# Patient Record
Sex: FEMALE | Race: WHITE | URBAN - METROPOLITAN AREA
[De-identification: names, ages, dates, MRNs, and addresses within clinical notes are randomized per-mention and may not be internally consistent; named-entity substitution may affect disease eponyms.]

---

## 2017-03-14 ENCOUNTER — TRANSFERRED RECORDS (OUTPATIENT)
Dept: HEALTH INFORMATION MANAGEMENT | Facility: CLINIC | Age: 37
End: 2017-03-14

## 2017-03-14 LAB
HPV ABSTRACT: NORMAL
PAP-ABSTRACT: NORMAL

## 2017-06-03 ENCOUNTER — HEALTH MAINTENANCE LETTER (OUTPATIENT)
Age: 37
End: 2017-06-03

## 2017-07-09 ENCOUNTER — OFFICE VISIT (OUTPATIENT)
Dept: URGENT CARE | Facility: URGENT CARE | Age: 37
End: 2017-07-09
Payer: COMMERCIAL

## 2017-07-09 VITALS
WEIGHT: 136 LBS | OXYGEN SATURATION: 98 % | DIASTOLIC BLOOD PRESSURE: 80 MMHG | RESPIRATION RATE: 14 BRPM | BODY MASS INDEX: 27.47 KG/M2 | TEMPERATURE: 98.2 F | SYSTOLIC BLOOD PRESSURE: 120 MMHG | HEART RATE: 86 BPM

## 2017-07-09 DIAGNOSIS — R30.0 DYSURIA: ICD-10-CM

## 2017-07-09 DIAGNOSIS — N30.00 ACUTE CYSTITIS WITHOUT HEMATURIA: Primary | ICD-10-CM

## 2017-07-09 DIAGNOSIS — B37.31 YEAST INFECTION OF THE VAGINA: ICD-10-CM

## 2017-07-09 LAB
ALBUMIN UR-MCNC: 30 MG/DL
APPEARANCE UR: CLEAR
BACTERIA #/AREA URNS HPF: ABNORMAL /HPF
BILIRUB UR QL STRIP: ABNORMAL
COLOR UR AUTO: YELLOW
GLUCOSE UR STRIP-MCNC: NEGATIVE MG/DL
HGB UR QL STRIP: ABNORMAL
KETONES UR STRIP-MCNC: NEGATIVE MG/DL
LEUKOCYTE ESTERASE UR QL STRIP: ABNORMAL
NITRATE UR QL: NEGATIVE
NON-SQ EPI CELLS #/AREA URNS LPF: ABNORMAL /LPF
PH UR STRIP: 5.5 PH (ref 5–7)
RBC #/AREA URNS AUTO: ABNORMAL /HPF (ref 0–2)
SP GR UR STRIP: 1.02 (ref 1–1.03)
URN SPEC COLLECT METH UR: ABNORMAL
UROBILINOGEN UR STRIP-ACNC: 0.2 EU/DL (ref 0.2–1)
WBC #/AREA URNS AUTO: >100 /HPF (ref 0–2)

## 2017-07-09 PROCEDURE — 87186 SC STD MICRODIL/AGAR DIL: CPT | Performed by: PHYSICIAN ASSISTANT

## 2017-07-09 PROCEDURE — 81001 URINALYSIS AUTO W/SCOPE: CPT | Performed by: FAMILY MEDICINE

## 2017-07-09 PROCEDURE — 99213 OFFICE O/P EST LOW 20 MIN: CPT | Performed by: PHYSICIAN ASSISTANT

## 2017-07-09 PROCEDURE — 87086 URINE CULTURE/COLONY COUNT: CPT | Performed by: PHYSICIAN ASSISTANT

## 2017-07-09 RX ORDER — NITROFURANTOIN 25; 75 MG/1; MG/1
100 CAPSULE ORAL 2 TIMES DAILY
Qty: 14 CAPSULE | Refills: 0 | Status: SHIPPED | OUTPATIENT
Start: 2017-07-09 | End: 2017-09-18

## 2017-07-09 RX ORDER — FLUCONAZOLE 150 MG/1
150 TABLET ORAL ONCE
Qty: 1 TABLET | Refills: 0 | Status: SHIPPED | OUTPATIENT
Start: 2017-07-09 | End: 2017-07-09

## 2017-07-09 NOTE — PATIENT INSTRUCTIONS
"   * BLADDER INFECTION,Female (Adult)    A bladder infection (\"cystitis\" or \"UTI\") usually causes a constant urge to urinate and a burning when passing urine. Urine may be cloudy, smelly or dark. There may be pain in the lower abdomen. A bladder infection occurs when bacteria from the vaginal area enter the bladder opening (urethra). This can occur from sexual intercourse, wearing tight clothing, dehydration and other factors.  HOME CARE:  1. Drink lots of fluids (at least 6-8 glasses a day, unless you must restrict fluids for other medical reasons). This will force the medicine into your urinary system and flush the bacteria out of your body. Cranberry juice has been shown to help clear out the bacteria.  2. Avoid sexual intercourse until your symptoms are gone.  3. A bladder infection is treated with antibiotics. You may also be given Pyridium (generic = phenazopyridine) to reduce the burning sensation. This medicine will cause your urine to become a bright orange color. The orange urine may stain clothing. You may wear a pad or panty-liner to protect clothing.  PREVENTING FUTURE INFECTIONS:  1. Always wipe from front to back after a bowel movement.  2. Keep the genital area clean and dry.  3. Drink plenty of fluids each day to avoid dehydration.  4. Urinate right after intercourse to flush out the bladder.  5. Wear cotton underwear and cotton-lined panty hose; avoid tight-fitting pants.  6. If you are on birth control pills and are having frequent bladder infections, discuss with your doctor.  FOLLOW UP: Return to this facility or see your doctor if ALL symptoms are not gone after three days of treatment.  GET PROMPT MEDICAL ATTENTION if any of the following occur:    Fever over 101 F (38.3 C)    No improvement by the third day of treatment    Increasing back or abdominal pain    Repeated vomiting; unable to keep medicine down    Weakness, dizziness or fainting    Vaginal discharge    Pain, redness or swelling in " the labia (outer vaginal area)    7784-6898 [employer], [address] [place] [address], [place] [address]. All rights reserved. This information is not intended as a substitute for professional medical care. Always follow your healthcare professional's instructions.

## 2017-07-09 NOTE — PROGRESS NOTES
Mayte DUENAS Wendipresents to clinic today c/o 1 day hx of dysuria, urinary urgency/frequency and suprapubic area pressure.    No hx of resistant infection, kidney stones, or kidney infections.      Red Flag Review: Denies any abdominal pain or flank pain. Denies any F/C/N/V/D or other acute sxs.    General Disposition. Despite above acute illness sxs, patient still alert, active and taking in PO solids and fluids.       ROS:     GI: Denies any abdominal pain. Denies any F/C/N/V/D.     GYN: Denies any pelvic pain. No abnormal vaginal discharge.No concerns about pregnancy or STD. Declined HCG screening and STD screening today.   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    No Known Allergies      OBJECTIVE:  /80 (BP Location: Right arm, Cuff Size: Adult Regular)  Pulse 86  Temp 98.2  F (36.8  C) (Oral)  Resp 14  Wt 136 lb (61.7 kg)  LMP 06/25/2017 (Approximate)  SpO2 98%  BMI 27.47 kg/m2        GENERAL:  Very pleasant, comfortable and generally well appearing.  SKIN: No rashes.  Normal color.  Sclera clear.  CARDIAC:NORMAL - regular rate and rhythm without murmur., normal s1/s2 and without extra heart sounds  RESP: Normal - CTA without rales, rhonchi, or wheezing.  ABDOMEN:  Soft, non-tender, non-distended.  Positive normal bowel sounds.  No HSM or masses.  Positive, mild, suprapubic tenderness.  No CVA tenderness.  NEURO: Alert and oriented.  Normal speech and mentation.  CN II/XII grossly intact.  Gait within normal limits.      Component      Latest Ref Rng & Units 7/9/2017   Color Urine       Yellow   Appearance Urine       Clear   Glucose Urine       "NEG mg/dL Negative   Bilirubin Urine      NEG Small (A) . . .   Ketones Urine      NEG mg/dL Negative   Specific Gravity Urine      1.003 - 1.035 1.025   Blood Urine      NEG Small (A)   pH Urine      5.0 - 7.0 pH 5.5   Protein Albumin Urine      NEG mg/dL 30 (A)   Urobilinogen Urine      0.2 - 1.0 EU/dL 0.2   Nitrite Urine      NEG Negative   Leukocyte Esterase Urine      NEG Small (A)   Source       Midstream Urine   WBC Urine      0 - 2 /HPF >100 (A)   RBC Urine      0 - 2 /HPF 2-5 (A)   Squamous Epithelial /LPF Urine      FEW /LPF Few   Bacteria Urine      NEG /HPF Many (A)     ASSESSMENT/PLAN:    (N30.00) Acute cystitis without hematuria  (primary encounter diagnosis)  Plan: nitroFURantoin, macrocrystal-monohydrate,         (MACROBID) 100 MG capsule        1. Abx as per above.     2. Push plain, non-carbonated water.  Avoid acidic fluids and bladder irritants (reviewed with patient). Take full course of antibiotic as prescribed.     3. Follow-up immediately if any fever, chills, nausea, vomiting, back or flank pain.  Follow-up with PCP if there are any residual urinary symptoms after course of prescribed antibiotics.        4. Follow-up with PCP if sxs change, worsen or fail to fully resolve with above tx.       5. In addition to the above, pyelonephritis and UTI \"red flag\" signs and sxs are reviewed with pt both verbally and by way of printed educational material for home review.  Pt verbalizes understanding of and agrees to the above plan.         (R30.0) Dysuria  Plan: UA reflex to Microscopic and Culture, Urine         Microscopic, Urine Culture Aerobic Bacterial,         CANCELED: *UA reflex to Microscopic and Culture        ([facility] (except [place]        and [place])      (B37.3) Yeast infection of the vagina  Comment: Patient requests \"just in case\" rx for Diflucan. States she always gets yeast vaginitis with use of abx   Plan: fluconazole (DIFLUCAN) 150 MG tablet        "

## 2017-07-09 NOTE — MR AVS SNAPSHOT
"              After Visit Summary   7/9/2017    Mayte Adame    MRN: [id number]           Patient Information     Date Of Birth          1980        Visit Information        Provider Department      7/9/2017 11:30 AM Eliseo Camacho PA-C [facility]        Today's Diagnoses     Acute cystitis without hematuria    -  1    Dysuria        Yeast infection of the vagina          Care Instructions       * BLADDER INFECTION,Female (Adult)    A bladder infection (\"cystitis\" or \"UTI\") usually causes a constant urge to urinate and a burning when passing urine. Urine may be cloudy, smelly or dark. There may be pain in the lower abdomen. A bladder infection occurs when bacteria from the vaginal area enter the bladder opening (urethra). This can occur from sexual intercourse, wearing tight clothing, dehydration and other factors.  HOME CARE:  1. Drink lots of fluids (at least 6-8 glasses a day, unless you must restrict fluids for other medical reasons). This will force the medicine into your urinary system and flush the bacteria out of your body. Cranberry juice has been shown to help clear out the bacteria.  2. Avoid sexual intercourse until your symptoms are gone.  3. A bladder infection is treated with antibiotics. You may also be given Pyridium (generic = phenazopyridine) to reduce the burning sensation. This medicine will cause your urine to become a bright orange color. The orange urine may stain clothing. You may wear a pad or panty-liner to protect clothing.  PREVENTING FUTURE INFECTIONS:  1. Always wipe from front to back after a bowel movement.  2. Keep the genital area clean and dry.  3. Drink plenty of fluids each day to avoid dehydration.  4. Urinate right after intercourse to flush out the bladder.  5. Wear cotton underwear and cotton-lined panty hose; avoid tight-fitting pants.  6. If you are on birth control pills and are having frequent bladder infections, discuss with " your doctor.  FOLLOW UP: Return to this facility or see your doctor if ALL symptoms are not gone after three days of treatment.  GET PROMPT MEDICAL ATTENTION if any of the following occur:    Fever over 101 F (38.3 C)    No improvement by the third day of treatment    Increasing back or abdominal pain    Repeated vomiting; unable to keep medicine down    Weakness, dizziness or fainting    Vaginal discharge    Pain, redness or swelling in the labia (outer vaginal area)    [id number] [employer], [address] [place] [address], [place] [address]. All rights reserved. This information is not intended as a substitute for professional medical care. Always follow your healthcare professional's instructions.            Follow-ups after your visit        Who to contact     If you have questions or need follow up information about today's clinic visit or your schedule please contact [facility] directly at [phone].  Normal or non-critical lab and imaging results will be communicated to you by [employer], letter or phone within 4 business days after the clinic has received the results. If you do not hear from us within 7 days, please contact the clinic through [employer] or phone. If you have a critical or abnormal lab result, we will notify you by phone as soon as possible.  Submit refill requests through [employer] or call your pharmacy and they will forward the refill request to us. Please allow 3 business days for your refill to be completed.          Additional Information About Your Visit        [employer] Information     [employer] gives you secure access to your electronic health record. If you see a primary care provider, you can also send messages to your care team and make appointments. If you have questions, please call your primary care clinic.  If you do not have a primary care provider, please call [phone] and they will assist you.        Care EveryWhere ID     This is your Care EveryWhere ID. This  could be used by other organizations to access your [facility] medical records  [id number]        Your Vitals Were     Pulse Temperature Respirations Last Period Pulse Oximetry BMI (Body Mass Index)    86 98.2  F (36.8  C) (Oral) 14 06/25/2017 (Approximate) 98% 27.47 kg/m2       Blood Pressure from Last 3 Encounters:   07/09/17 120/80   01/28/16 126/89   01/14/16 121/82    Weight from Last 3 Encounters:   07/09/17 136 lb (61.7 kg)   01/28/16 181 lb 8 oz (82.3 kg)   01/14/16 188 lb 14.4 oz (85.7 kg)              We Performed the Following     UA reflex to Microscopic and Culture     Urine Culture Aerobic Bacterial     Urine Microscopic          Today's Medication Changes          These changes are accurate as of: 7/9/17 12:24 PM.  If you have any questions, ask your nurse or doctor.               Start taking these medicines.        Dose/Directions    fluconazole 150 MG tablet   Commonly known as:  DIFLUCAN   Used for:  Yeast infection of the vagina        Dose:  150 mg   Take 1 tablet (150 mg) by mouth once for 1 dose   Quantity:  1 tablet   Refills:  0       nitroFURantoin (macrocrystal-monohydrate) 100 MG capsule   Commonly known as:  MACROBID   Used for:  Acute cystitis without hematuria        Dose:  100 mg   Take 1 capsule (100 mg) by mouth 2 times daily   Quantity:  14 capsule   Refills:  0            Where to get your medicines      These medications were sent to [id number]  [place], [place] - [address] [facility]  AT [facility] & [address] [place] [address]     Phone:  [phone]     fluconazole 150 MG tablet    nitroFURantoin (macrocrystal-monohydrate) 100 MG capsule                Primary Care Provider    [facility]                Equal Access to Services     [facility] [place] AH: Ky Hernandez, sabrina larson, ruddy pollock. So [facility] [phone].    ATENCIÓN: Si alf vela, alejo manzo hernandez  disposición servicios gratuitos de asistencia lingüística. Trixie davison [phone].    We comply with applicable federal civil rights laws and [place] laws. We do not discriminate on the basis of race, color, national origin, age, disability sex, sexual orientation or gender identity.            Thank you!     Thank you for choosing [facility]  for your care. Our goal is always to provide you with excellent care. Hearing back from our patients is one way we can continue to improve our services. Please take a few minutes to complete the written survey that you may receive in the mail after your visit with us. Thank you!             Your Updated Medication List - Protect others around you: Learn how to safely use, store and throw away your medicines at www.disposemymeds.org.          This list is accurate as of: 7/9/17 12:24 PM.  Always use your most recent med list.                   Brand Name Dispense Instructions for use Diagnosis    fluconazole 150 MG tablet    DIFLUCAN    1 tablet    Take 1 tablet (150 mg) by mouth once for 1 dose    Yeast infection of the vagina       nitroFURantoin (macrocrystal-monohydrate) 100 MG capsule    MACROBID    14 capsule    Take 1 capsule (100 mg) by mouth 2 times daily    Acute cystitis without hematuria

## 2017-07-11 LAB
BACTERIA SPEC CULT: ABNORMAL
MICRO REPORT STATUS: ABNORMAL
MICROORGANISM SPEC CULT: ABNORMAL
SPECIMEN SOURCE: ABNORMAL

## 2017-08-23 ENCOUNTER — HOSPITAL ENCOUNTER (OUTPATIENT)
Dept: BEHAVIORAL HEALTH | Facility: CLINIC | Age: 37
Discharge: HOME | End: 2017-08-23
Attending: SOCIAL WORKER | Admitting: SOCIAL WORKER
Payer: COMMERCIAL

## 2017-08-23 PROCEDURE — H0001 ALCOHOL AND/OR DRUG ASSESS: HCPCS

## 2017-08-23 NOTE — PROGRESS NOTES
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 what was helpful to abstain was being connected to recovery community stuff. Clt reported what lead back to use was weight gain. Clt reported cravings to use and triggers of overeating, and inability to focus and work. Clt reported 9 treatments in her lifetime in which she completed 3 of them. Clt reported past and current meeting attendance.          DIMENSION VI - Recovery Environment   1. Are you employed/attending school? Tell me about that.     Per clt: [employer], part time, supportive in taking a leave of absence    2A. Describe a typical day; evening for you. Work, school, social, leisure, volunteer, spiritual practices. Include time spent obtaining, using, recovering from drugs or alcohol. (DSM)     Per clt: get up and work from 10-4 M-Thursday, get home, make dinner, then do meeting, she stays home, otherwise spend time with her, weekends she goes with dad, and that is when I typically end of using.     2B. How often do you spend more time than you planned using or use more than you planned? (DSM)     Per clt: once per week. When doing meth.     3. How important is using to your  "social connections? Do many of your family or friends use?     Per clt: it is not important, my good friends do not use. Isolated thing    4A. Are you currently in a significant relationship?     No    4C. Sexual Orientation:     Heterosexual    5A. Who do you live with?      Per clt: daughter    5B. Tell me about their alcohol/drug use and mental health issues.     NA    5C. Are you concerned for your safety there? No    5D. Are you concerned about the safety of anyone else who lives with you? No    6A. Do you have children who live with you?     Yes.  (Ask follow-up questions to determine the person s relationship and responsibility, both legal and care giving; and what arrangements are made for supervision for the children when the person is not available.) per clt: Ganga, 11, goes with dad on weekends, he is fighting for full custody, right now I have full custody, and allow him to see her whenever he wants to, pretty open.    6B. Do you have children who do not live with you?     Yes.  (Ask follow up questions to learn where the children are, who has custody and what the person s relationship and responsibility is with these children and what hopes the person has for his or her future with these children.)Per clt:  Mikey 18, in college. Do not see him very often, goes to [employer]. Living down there.     7A. Who supports you in making changes in your alcohol or drug use? What are they willing to do to support you? Who is upset or angry about you making changes in your alcohol or drug use? How big a problem is this for you?      Per clt: \"family, friends, employer\".    7B. This table is provided to record information about the person s relationships and available support It is not necessary to ask each item; only to get a comprehensive picture of their support system.  How often can you count on the following people when you need someone?   Partner / Spouse N/A   Parent(s)/Aunt(s)/Uncle(s)/Grandparents " Always supportive   Sibling(s)/Cousin(s) Always supportive   Child(nav) Usually supportive   Other relative(s) Always supportive   Friend(s)/neighbor(s) Usually supportive   Child(nav) s father(s)/mother(s) Usually supportive   Support group member(s) Usually supportive   Community of samia members Always supportive   /counselor/therapist/healer Always supportive   Other (specify) N/A     8A. What is your current living situation?     Per clt: Live with daughter in apartment.     8B. What is your long term plan for where you will be living?     Per clt: year lease there.    8C. Tell me about your living environment/neighborhood? Ask enough follow up questions to determine safety, criminal activity, availability of alcohol and drugs, supportive or antagonistic to the person making changes.      Per clt: [place], during the day feel safe, little different at night, little rougher then I realized but not like [place] so it is fine.     9. Criminal justice history: Gather current/recent history and any significant history related to substance use--Arrests? Convictions? Circumstances? Alcohol or drug involvement? Sentences? Still on probation or parole? Expectations of the court? Current court order? Any sex offenses - lifetime? What level? (DSM)    Per clt: None, denied past or current legals. Reported Only Family Court case and CPS case.  Legal-no criminal record, just Family Services, through [facility], CPS case started I believe two months ago, , Rosey, do not know her last name. She is not my worker she is going to report back to court if I am addressing chemical health needs.     10. What obstacles exist to participating in treatment? (Time off work, childcare, funding, transportation, pending MCC time, living situation)     None    Dimension VI Ratings   Recovery environment - The placing authority must use the criteria in Dimension VI to determine a client s  recovery environment.   RISK DESCRIPTIONS - Severity ratin Client has (A) Chronically antagonistic significant other, living environment, family, peer group or long-term criminal justice involvement that is harmful to recovery or treatment progress, or (B) Client has an actively antagonistic significant other, family, work, or living environment with immediate threat to the client s safety and well-being.    REASONS SEVERITY WAS ASSIGNED - (What support does the person have for making changes? What structure/stability does the person have in his or her daily life that will increase the likelihood that changes can be sustained? What problems exist in the person s environment that will jeopardize getting/staying clean and sober?)     (B) Marcelo reported she works part time but reported her employer is supportive of her taking a [employer]. Marcelo reported using is not important to her good friends. Marcelo reported she lives with her daughter. Marcelo denied being in a romantic relationship at this time. Marcelo reported she has full custody of her daughter at this time but there is a case through Family Court Services as daughter's father is fighting for full custody. Joelt reported daughter goes with father on weekends but he is able to see her anytime. Joelt reported she has one adult son who lives independently. Marcelo reported family, friends, and employer are supportive of her. Marcelo denied past or current legal issues. Marcelo reported she has a case with CPS and also another case with Family Court Services.         Client Choice/Exceptions   Would you like services specific to language, age, gender, culture, Religion preference, race, ethnicity, sexual orientation or disability?  No    What particular treatment choices and options would you like to have? IP    Do you have a preference for a particular treatment program? [facility]    Criteria for Diagnosis     Criteria for Diagnosis  DSM-5 Criteria for Substance  Use Disorder  Instructions: Determine whether the client currently meets the criteria for Substance Use Disorder using the diagnostic criteria in the DSM-V [id number].[phone]. Current means during the most recent 12 months outside a facility that controls access to substances    Category of Substance Severity (ICD-10 Code / DSM 5 Code)     Alcohol Use Disorder Moderate  (F10.20) (303.90)   Cannabis Use Disorder Severe   (F12.20) (304.30)   Hallucinogen Use Disorder NA   Inhalant Use Disorder NA   Opioid Use Disorder NA   Sedative, Hypnotic, or Anxiolytic Use Disorder NA   Stimulant Related Disorder Severe   (F15.20) (304.40) Amphetamine type substance   Tobacco Use Disorder NA   Other (or unknown) Substance Use Disorder NA       Collateral Contact Summary   Number of contacts made: 2, 3 received information on 8/24/17 for 3rd collateral contact.    Contact with referring person:  Yes, [facility].    If court related records were reviewed, summarize here: NA    Information from collateral contacts supported/largely agreed with information from the client and associated risk ratings.      Rule 25 Assessment Summary and Plan   's Recommendation    1. Abstain from using all non-prescribed mood altering chemicals and substances, including alcohol.   2. Attend and complete an inpatient treatment program such as [facility] program. Follow all recommendations and referrals made by counselor(s) during admission and at discharge of program.  3. Continue to attend sober support group meetings weekly until admitted to inpatient treatment program.   4. Comply with all requirements/referrals made by CPS, and Family Court Services.   5. Continue care with all care providers such as therapist and follow recommendations and referrals made by therapist.    Rationale: Clt reported her use has negatively impacted multiple areas of her life. Clt reported she has continued to use even knowing the negative  consequences of her use. Clt appears to be at a high risk for continued use and reported she is unable to abstain from use in her current environment. These are the reasons for the above recommendations.      Collateral Contacts     Name:    [facility] ([facility])   Relationship:    Medical Records   Phone Number:    NA Releases:    Yes     See throughout above cd assessment collateral obtained from EMR.    Collateral Contacts     Name:    Fallon Jerson/ Lyle Conn   Relationship:    Therapist   Phone Number:    [phone] Releases:    Yes     Left  for collateral purposes on 8/23/17. Unable to obtain collateral information by the signing of this assessment. Received a call from collateral contact on 8/24/17 at 10:06AM providing collateral information. Collateral reported he had been meeting with her 5x. Came earlier in the year 2x and then discontinued coming here as she was actively using at the time. Four times since 7/20/17 she is really struggling, she really could use some help probably inpatient in my opinion, most appropriate for her right now. We did come up with a robust plan last week while at home, and to attend meetings and she was not able to make it,did continue to use at home, I believe only 48 hours. Motivated to stop use and has been to treatment multiple times. If we could get her away from drug use long enough to make good choices that would be good.       Collateral Contacts     Name:    [facility]/Rosey Cristobal   Relationship:    Family Court Phone Number:    [phone]   Releases:    Yes     Called and reported on 8/23/17: Daughter's dad filed a motion asking for emergency custody due to Client use and concern. Collateral reported what Family Court Services due is evaluations on issues, received case referrals, is Client compliant with chemical health, if not, what are some of the steps the Client should be taking, and how does it  effect Client child. Collateral reported because of that met with Client, dad, grandmother, daughter, spoke with Deanna, and Client therapist, and [facility]. Collateral reported Client was also court ordered to UA's, and Client just did not go to those. Collateral reported she asked the Client to test (UA) when she met with her in July, 2017 and Client did not go. Collateral reported she had to go back to  and report that she cannot rule use out, but does not have a lot to go off of with regard to chemical use but that she was not following through was a giant red flag. Collateral reported Client admitted to collateral contact she was not following through with steps. Collateral reported it was suggested to Client to get an evaluation and follow recommendations, now decided to do that. Collateral contact reported Client lead her to believe she was not in active addiction, only doing marijuana, when she was not providing care to child. Collateral contact reported dad was concerned about meth/marijuana use when Client was also in the home with daughter. Collateral reported that it is not her job to make recommendations on parenting or anything along the lines of CPS. Collateral reported: It has been reported that daughter goes with dad a lot more then weekends, during the week as well, according to dad, daughter, and grandmother, Client will often call, and ask him (dad)  to take her more often because she cannot deal with it or handle it at that time. Collateral reported that she has spoken to therapist, and counselor through [facility], and they reported it appears Client has the skills, and has gone to numerous treatments, but does not put the skills into practice. Collateral reported therapist is now doing EMDR with the Client. Collateral reported her therapist and [facility] believe Client needs really intensive support once she is out of an inpatient treatment  setting.Collateral reported: Hearing for this case will be in the next month or two.Collateral reported: Once her report is made then this case is closed for collateral contact specifically.     ollateral Contacts      A problematic pattern of alcohol/drug use leading to clinically significant impairment or distress, as manifested by at least two of the following, occurring within a 12-month period:    Alcohol/drug is often taken in larger amounts or over a longer period than was intended.  There is a persistent desire or unsuccessful efforts to cut down or control alcohol/drug use  A great deal of time is spent in activities necessary to obtain alcohol, use alcohol, or recover from its effects.  Craving, or a strong desire or urge to use alcohol/drug  Recurrent alcohol/drug use resulting in a failure to fulfill major role obligations at work, school or home.  Continued alcohol use despite having persistent or recurrent social or interpersonal problems caused or exacerbated by the effects of alcohol/drug.  Important social, occupational, or recreational activities are given up or reduced because of alcohol/drug use.  Recurrent alcohol/drug use in situations in which it is physically hazardous.  Alcohol/drug use is continued despite knowledge of having a persistent or recurrent physical or psychological problem that is likely to have been caused or exacerbated by alcohol.  Tolerance, as defined by either of the following: A need for markedly increased amounts of alcohol/drug to achieve intoxication or desired effect. and A markedly diminished effect with continued use of the same amount of alcohol/drug.  Withdrawal, as manifested by either of the following: The characteristic withdrawal syndrome for alcohol/drug (refer to Criteria A and B of the criteria set for alcohol/drug withdrawal).      Specify if: In early remission:  After full criteria for alcohol/drug use disorder were previously met, none of the criteria  for alcohol/drug use disorder have been met for at least 3 months but for less than 12 months (with the exception that Criterion A4,  Craving or a strong desire or urge to use alcohol/drug  may be met).     In sustained remission:   After full criteria for alcohol use disorder were previously met, non of the criteria for alcohol/drug use disorder have been met at any time during a period of 12 months or longer (with the exception that Criterion A4,  Craving or strong desire or urge to use alcohol/drug  may be met).   Specify if:   This additional specifier is used if the individual is in an environment where access to alcohol is restricted.    Mild: Presence of 2-3 symptoms    Moderate: Presence of 4-5 symptoms    Severe: Presence of 6 or more symptoms
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
## 2017-09-18 ENCOUNTER — HOSPITAL ENCOUNTER (OUTPATIENT)
Dept: BEHAVIORAL HEALTH | Facility: CLINIC | Age: 37
End: 2017-09-18
Attending: PSYCHIATRY & NEUROLOGY
Payer: COMMERCIAL

## 2017-09-18 VITALS — TEMPERATURE: 98.1 F | DIASTOLIC BLOOD PRESSURE: 89 MMHG | HEART RATE: 95 BPM | SYSTOLIC BLOOD PRESSURE: 129 MMHG

## 2017-09-18 PROCEDURE — H2035 A/D TX PROGRAM, PER HOUR: HCPCS | Mod: HQ

## 2017-09-18 PROCEDURE — 10020000 ZZH LODGING PLUS FACILITY CHARGE ADULT

## 2017-09-18 RX ORDER — CIPROFLOXACIN 250 MG/1
250 TABLET, FILM COATED ORAL 2 TIMES DAILY
COMMUNITY

## 2017-09-18 RX ORDER — MAGNESIUM HYDROXIDE/ALUMINUM HYDROXICE/SIMETHICONE 120; 1200; 1200 MG/30ML; MG/30ML; MG/30ML
30 SUSPENSION ORAL EVERY 6 HOURS PRN
COMMUNITY

## 2017-09-18 RX ORDER — LORATADINE 10 MG/1
10 TABLET ORAL DAILY PRN
COMMUNITY

## 2017-09-18 RX ORDER — AMOXICILLIN 250 MG
2 CAPSULE ORAL DAILY PRN
COMMUNITY

## 2017-09-18 ASSESSMENT — ANXIETY QUESTIONNAIRES
5. BEING SO RESTLESS THAT IT IS HARD TO SIT STILL: MORE THAN HALF THE DAYS
3. WORRYING TOO MUCH ABOUT DIFFERENT THINGS: SEVERAL DAYS
1. FEELING NERVOUS, ANXIOUS, OR ON EDGE: MORE THAN HALF THE DAYS
GAD7 TOTAL SCORE: 11
2. NOT BEING ABLE TO STOP OR CONTROL WORRYING: SEVERAL DAYS
6. BECOMING EASILY ANNOYED OR IRRITABLE: NEARLY EVERY DAY
7. FEELING AFRAID AS IF SOMETHING AWFUL MIGHT HAPPEN: NOT AT ALL
4. TROUBLE RELAXING: MORE THAN HALF THE DAYS

## 2017-09-18 ASSESSMENT — PATIENT HEALTH QUESTIONNAIRE - PHQ9: SUM OF ALL RESPONSES TO PHQ QUESTIONS 1-9: 17

## 2017-09-18 NOTE — PROGRESS NOTES
Initial Services Plan        Before your first treatment group, please do the following    Immediate health & safety concerns: Go to the emergency room if you start to have withdrawal symptoms.  Look for sober housing and a supportive social network.  Look for a support network (such as AA, NA, DBT group, a Tenriism group, etc.)    Suggestions for client during the time between intake & completion of treatment plan:  Tour your treatment center (unit or outpatient clinic).  Introduce yourself to the treatment group.  Spend time getting to know your peers.  Review your patient or client handbook.    Client issues to be addressed in the first treatment sessions:  Other Pt said the CPS is requiring that she be sober to retain custody of her children. She said they are not requiring tx, but she can't stay sober on her own, so she needs tx.       HARDIK Summers  9/18/2017  7:34 AM

## 2017-09-18 NOTE — PROGRESS NOTES
Name: Mayte Adame  Date: 9/18/2017  Medical Record: [id number]    Envelope Number: [id number]    List of Contents (List each item separately in new row):   Women's Vitamin Bottle    Admission:  I am responsible for any personal items that are not sent to the safe or pharmacy.  [facility] is not responsible for loss, theft or damage of any property in my possession.      Patient Signature:  ___________________________________________       Date/Time:__________________________    Staff Signature: __________________________________       Date/Time:__________________________    2nd Staff person, if patient is unable/unwilling to sign:      __________________________________________________________       Date/Time: __________________________      Discharge:  [facility] has returned all of my personal belongings:    Patient Signature: ________________________________________     Date/Time: ____________________________________    Staff Signature: ______________________________________     Date/Time:_____________________________________

## 2017-09-18 NOTE — PROGRESS NOTES
[facility]ING [facility] ADMISSION UPDATE       Date of update: Update Evaluation Counselor:   9/18/2017     Jameel Keyes [facility]     Date of original CD evaluation: Original Evaluation Counselor:   8/23/17     Eva Torres     PHQ Score 10 or greater:   Yes, If yes explain: 17       ORESTES-7 Score 10 or greater:   Yes, If yes explain: 11         Suicide Screening Questions:   1. Are you feeling hopeless about the present/future?     No   2. Have you ever had thoughts about taking your life?     No   3. When did you have these thoughts?     Per clt several years ago   4. Do you have any current intent or active desire to take your life?     No   5. Do you have a plan to take your life?     No   6. Have you ever made a suicide attempt?     No   7. Do you have access to pills, guns or other methods to kill yourself?     No     Guide to Risk Ratings   IDEATION: Active thoughts of suicide? INTENT: Intent to follow on suicide? PLAN: Plan to follow through on suicide? Level of Risk:   IF Yes Yes Yes Patient = High Emergent   IF Yes Yes No Patient = High Urgent/Non-Emergent   IF Yes No No Patient = Moderate Non-Urgent   IF No No   No Patient = Low Risk   The patient's ADDITIONAL RISK FACTORS and lack of PROTECTIVE FACTORS may increase their overall suicide risk ratings.     Patient's Responses (within the last 30 days)   IDEATION: Active thoughts of suicide?    No     INTENT: Intent to follow on suicide?    No     PLAN: Plan to follow through on suicide?    No     Determining the level of risk depends on the patient responses, suicide risk factors and protective factors.     Additional Risk Factors: Significant history of having untreated or poorly treated mental health symptoms  A recent death of someone close to the patient and/or unresolved grief and loss issues  Significant history of trauma and/or abuse issues  CPS involvement   Protective Factors:  Having people in his/her life that would prevent the patient from considering  "committing suicide (i.e. young children, spouse, parents, etc.)  An absence of chronic health problems or stable and well treated chronic health issues  Having restricted access to highly lethal means of suicide     Risk Status   Emergent? No   Urgent / Non-Emergent? No   Present / Non- Urgent? No    Low Risk? Yes, Evaluation Counselors - Document in Epic / SBAR to counselor \"No identified risk\" and Treatment Counselors - Assess weekly in progress notes under Dimension 3 and summarize in Discharge / Treatment summary under Dimension 3.   Additional information to support suicide risk rating: See Above     Current CURT: Current UA:     .000       Positive for THC, AMP, MET, MDMA and negative for all other screened drugs.         Alcohol/Drug use since the last CD evaluation (include date and time of last use):     Alcohol a few drinks NATHEN 2-3 drinks 9 pm 9/1/17  Pot every other day a few bowls NATHEN 9/15/17 10 pm several bowls for the day  Meth daily  Several bowls NATHEN 9/15/17  10 pm 1/2 gram smoked       Please note any other clinical changes since the last CD evaluation (such as medication changes, additional legal charges, detoxification admissions, overdoses, etc.)     No significant changes since the last CD evaluation         Current ASAM Dimensions   I.) Intoxication and Withdrawal: 0   II.) Biomedical:  0   III.) Emotional and Behavioral:  2   IV.) Readiness to Change:  3   V.) Relapse Potential: 4   VI.) Recovery Environmental: 4         "

## 2017-09-18 NOTE — PROGRESS NOTES
[address] [address]          Connor Houser. CPS.  Tel  [phone]  Fax [phone]    Dear Connor,    This is to verify that Mayte Adame was admitted for treatment of chemical dependency at [place], [place] on 9/18/2017.    This individual is currently participating in:   ______ Inpatient   ___x___ [facility] (Residential Non-Medical)   ______ Day Outpatient   ______ Evening Outpatient       The client will participate in the program from three to four weeks, depending on the needs of the client.  Treatment is A.A. based and helps clients to achieve a chemically free lifestyle through lectures, individual, family and group therapy.  She has been assigned to [facility]: [phone] as her primary counselors.  If you have further questions, please contact us.    Respectfully,    HARDIK Summers LIC, SARA  Licensed Psychotherapist Supervisor  [address] [place]. [address]  [email or web address]  Tel. [phone]  Fax. [phone]    Faxed 9/18/2017
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
## 2017-09-18 NOTE — PROGRESS NOTES
9/18/17  Pt entered the LP unit, this date, and attended the afternoon group.  Pt was provided basic materials to include a Big Book, treatment goal sheet and a Patient Safety Plan.  Pt verbalizes willingness for treatment at this time.  Pt to attend LP orientation and continue program.

## 2017-09-18 NOTE — PROGRESS NOTES
Name: Mayte Adame  Date: 9/18/2017  Medical Record: [id number]    Envelope Number: [id number]    List of Contents (List each item separately in new row):   2 cell phones - 1 cracked  Admission:  I am responsible for any personal items that are not sent to the safe or pharmacy.  [facility] is not responsible for loss, theft or damage of any property in my possession.      Patient Signature:  ___________________________________________       Date/Time:__________________________    Staff Signature: __________________________________       Date/Time:__________________________    2nd Staff person, if patient is unable/unwilling to sign:      __________________________________________________________       Date/Time: __________________________      Discharge:  [facility] has returned all of my personal belongings:    Patient Signature: ________________________________________     Date/Time: ____________________________________    Staff Signature: ______________________________________     Date/Time:_____________________________________

## 2017-09-18 NOTE — PROGRESS NOTES
Lodging [facility] Nursing Health Assessment    Vital signs: BP -  129/89  Pulse - 95  Temp -  98.1    Direct admission    Counselor: Michelle Latham   Drug of Choice: Methamphetamine & THC  Last use: 9/15/2017  Home clinic/MD: None  Psychiatrist/therapist: Therapist; Helena Mas at [facility]  Last history and physical done on 9/15/2017 at Urgent Care. No H & P needed at this time.     Medical history/current conditions: Hx esophageal reflux, migraines, UTI (completing course of abx currently), ABD surgery (most recent in 2/2017)    Mental Health diagnosis: Depression  Medication compliant?: Yes  Recent sucidal thoughts? None    When? N/A  Current thought of self-harm? None    Plan? N/A  Pt. Self rating of impulsiveness? (1-10 scale): 8    Pain assessment:   Pt. Experiencing pain at this time? No  Rating on 0-10 scale: (1-10 scale): 0  Location: None  Result of: N/A  L P pain management strategy: OTC medications    On-going nursing intervention required?   No

## 2017-09-19 ENCOUNTER — HOSPITAL ENCOUNTER (OUTPATIENT)
Dept: BEHAVIORAL HEALTH | Facility: CLINIC | Age: 37
End: 2017-09-19
Attending: FAMILY MEDICINE
Payer: COMMERCIAL

## 2017-09-19 PROCEDURE — H2035 A/D TX PROGRAM, PER HOUR: HCPCS | Mod: HQ

## 2017-09-19 PROCEDURE — 10020000 ZZH LODGING PLUS FACILITY CHARGE ADULT

## 2017-09-19 ASSESSMENT — ANXIETY QUESTIONNAIRES: GAD7 TOTAL SCORE: 11

## 2017-09-20 ENCOUNTER — HOSPITAL ENCOUNTER (OUTPATIENT)
Dept: BEHAVIORAL HEALTH | Facility: CLINIC | Age: 37
End: 2017-09-20
Attending: FAMILY MEDICINE
Payer: COMMERCIAL

## 2017-09-20 PROCEDURE — 10020000 ZZH LODGING PLUS FACILITY CHARGE ADULT

## 2017-09-20 PROCEDURE — H2035 A/D TX PROGRAM, PER HOUR: HCPCS

## 2017-09-20 PROCEDURE — H2035 A/D TX PROGRAM, PER HOUR: HCPCS | Mod: HQ
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
## 2017-09-21 ENCOUNTER — HOSPITAL ENCOUNTER (OUTPATIENT)
Dept: BEHAVIORAL HEALTH | Facility: CLINIC | Age: 37
End: 2017-09-21
Attending: FAMILY MEDICINE
Payer: COMMERCIAL

## 2017-09-21 PROCEDURE — H2035 A/D TX PROGRAM, PER HOUR: HCPCS | Mod: HQ

## 2017-09-21 PROCEDURE — 10020000 ZZH LODGING PLUS FACILITY CHARGE ADULT

## 2017-09-21 NOTE — PROGRESS NOTES
Acknowledgement of Current Treatment Plan     1. I have reviewed my treatment plan with my counselor on September 21, 2017. I agree with the plan as it is written in the electronic health record.    Name Signature   Mayte Adame      Name of Therapist / Counselor    HARDIK Jacobo       2. I have completed and reviewed my Safety Plan with my counselor and signed this on September 21, 2017. I have been given the hard copy of this plan.    Patient signature:  ________________________________________________________________________    Signatures required for any additional Problems, Goals, and/or Interventions added to treatment plan:    I have been given a copy of the addition to my treatment plan in Dimension _____ on [date           ] and I agree with this as it is written in the electronic record.     Patient signature:   ________________________________________________________________________    I have been given a copy of the addition to my treatment plan in Dimension _____ on [date           ] and I agree with this as it is written in the electronic record.      Patient signature:   ________________________________________________________________________    I have been given a copy of the addition to my treatment plan in Dimension _____ on [date          ] and I agree with this as it is written in the electronic record.     Patient signature:   ________________________________________________________________________    I have been given a copy of the addition to my treatment plan in Dimension _____ on [date         ] and I agree with this as it is written in the electronic record.      Patient signature:   ________________________________________________________________________

## 2017-09-22 ENCOUNTER — OFFICE VISIT (OUTPATIENT)
Dept: BEHAVIORAL HEALTH | Facility: CLINIC | Age: 37
End: 2017-09-22
Payer: COMMERCIAL

## 2017-09-22 ENCOUNTER — HOSPITAL ENCOUNTER (OUTPATIENT)
Dept: BEHAVIORAL HEALTH | Facility: CLINIC | Age: 37
End: 2017-09-22
Attending: FAMILY MEDICINE
Payer: COMMERCIAL

## 2017-09-22 VITALS
RESPIRATION RATE: 16 BRPM | HEART RATE: 86 BPM | OXYGEN SATURATION: 96 % | WEIGHT: 144 LBS | SYSTOLIC BLOOD PRESSURE: 100 MMHG | TEMPERATURE: 97.8 F | BODY MASS INDEX: 29.08 KG/M2 | DIASTOLIC BLOOD PRESSURE: 64 MMHG

## 2017-09-22 DIAGNOSIS — S61.411D LACERATION OF RIGHT HAND, FOREIGN BODY PRESENCE UNSPECIFIED, SUBSEQUENT ENCOUNTER: Primary | ICD-10-CM

## 2017-09-22 DIAGNOSIS — F19.20 CHEMICAL DEPENDENCY (H): ICD-10-CM

## 2017-09-22 PROCEDURE — 10020000 ZZH LODGING PLUS FACILITY CHARGE ADULT

## 2017-09-22 PROCEDURE — 99213 OFFICE O/P EST LOW 20 MIN: CPT | Performed by: NURSE PRACTITIONER

## 2017-09-22 PROCEDURE — H2035 A/D TX PROGRAM, PER HOUR: HCPCS | Mod: HQ

## 2017-09-22 RX ORDER — NEOMYCIN/BACITRACIN/POLYMYXINB 3.5-400-5K
OINTMENT (GRAM) TOPICAL 2 TIMES DAILY
Qty: 3.5 G | Refills: 0 | Status: SHIPPED | OUTPATIENT
Start: 2017-09-22

## 2017-09-22 RX ORDER — CEPHALEXIN 500 MG/1
500 CAPSULE ORAL 2 TIMES DAILY
Qty: 14 CAPSULE | Refills: 0 | Status: SHIPPED | OUTPATIENT
Start: 2017-09-22 | End: 2017-09-29

## 2017-09-22 NOTE — MR AVS SNAPSHOT
After Visit Summary   9/22/2017    Mayte Adame    MRN: [id number]           Patient Information     Date Of Birth          1980        Visit Information        Provider Department      9/22/2017 8:30 AM Ramandeep Simons APRN [facility]        Today's Diagnoses     Laceration of right hand, foreign body presence unspecified, subsequent encounter    -  1      Care Instructions    Keep covered, apply Neosporin twice per day  Start Keflex 500 mg twice per day for one week  Come back if it starts to ooze or turn more red or painful            Follow-ups after your visit        Your next 10 appointments already scheduled     Sep 23, 2017  7:15 AM CDT   Treatment with ADULT LODGING PLUS E   [facility] ([facility])    [address] [place] [address]   [phone]            Sep 24, 2017  7:15 AM CDT   Treatment with ADULT LODGING PLUS E   [facility] ([facility])    [address] [place] [address]   [phone]            Sep 25, 2017  7:15 AM CDT   Treatment with ADULT LODGING PLUS E   [facility] ([facility])    [address] [place] [address]   [phone]            Sep 26, 2017  7:15 AM CDT   Treatment with ADULT LODGING PLUS E   [facility] ([facility])    [address] [place] [address]   [phone]            Sep 27, 2017  7:15 AM CDT   Treatment with ADULT LODGING PLUS E   [facility] ([facility])    [address] [place] [address]   [phone]            Sep 28, 2017  7:15 AM CDT   Treatment with ADULT LODGING PLUS E   [facility] ([facility])    [facility] [address] [address]   [phone]            Sep 29, 2017  7:15 AM CDT   Treatment with ADULT LODGING PLUS E   [facility] ([facility])    [address] [place] [address]   [phone]            Sep 30, 2017  7:15 AM CDT   Treatment with ADULT LODGING PLUS E   [facility] ([facility])    [address] [place] [address]   [phone]            Oct 01, 2017  7:15 AM CDT   Treatment with ADULT LODGING PLUS E   [facility] ([facility])    [address] [place] [address]   [phone]            Oct 02, 2017  7:15 AM CDT   Treatment with ADULT LODGING PLUS E   [facility] ([facility])    [address] [place] [address]   [phone]              Who to contact     If you have questions or need follow up information about today's clinic visit or your schedule please contact [facility] directly at [phone].  Normal or non-critical lab and imaging results will be communicated to you by MyChart, letter or phone within 4 business days after the clinic has received the results. If you do not hear from us within 7 days, please contact the clinic through MyChart or phone. If you have a critical or abnormal lab result, we will notify you by phone as soon as possible.  Submit refill requests through [employer] or call your pharmacy and they will forward the refill request to us. Please allow 3 business days for your refill to be completed.          Additional Information About Your Visit        [employer] Information     [employer] gives you secure access to your electronic health record. If  you see a primary care provider, you can also send messages to your care team and make appointments. If you have questions, please call your primary care clinic.  If you do not have a primary care provider, please call [phone] and they will assist you.        Care EveryWhere ID     This is your Care EveryWhere ID. This could be used by other organizations to access your [facility] medical records  [id number]        Your Vitals Were     Pulse Temperature Respirations Pulse Oximetry BMI (Body Mass Index)       86 97.8  F (36.6  C) (Oral) 16 96% 29.08 kg/m2        Blood Pressure from Last 3 Encounters:   09/22/17 100/64   07/09/17 120/80   01/28/16 126/89    Weight from Last 3 Encounters:   09/22/17 144 lb (65.3 kg)   07/09/17 136 lb (61.7 kg)   01/28/16 181 lb 8 oz (82.3 kg)              Today, you had the following     No orders found for display         Today's Medication Changes          These changes are accurate as of: 9/22/17  8:59 AM.  If you have any questions, ask your nurse or doctor.               Start taking these medicines.        Dose/Directions    cephALEXin 500 MG capsule   Commonly known as:  KEFLEX   Used for:  Laceration of right hand, foreign body presence unspecified, subsequent encounter   Started by:  Ramandeep Simons APRN CNP        Dose:  500 mg   Take 1 capsule (500 mg) by mouth 2 times daily for 7 days   Quantity:  14 capsule   Refills:  0       neomycin-bacitracin-polymyxin 5-400-5000 ointment   Used for:  Laceration of right hand, foreign body presence unspecified, subsequent encounter   Started by:  Ramandeep Simons APRN CNP        Apply topically 2 times daily   Quantity:  3.5 g   Refills:  0            Where to get your medicines      These medications were sent to [facility] [place], [place] - [address]  [address] [address]     Phone:  [phone]     cephALEXin 500 MG capsule    neomycin-bacitracin-polymyxin  5400-5000 ointment                Primary Care Provider    Provider Not In System                Equal Access to Services     PAULAANTHONY LORETA : Hadii jess ku hadbrendadiane Sopamali, waaxda luqadaha, qaybta franciscacolleenjesus gamble, ruddy leavittadelaiqra webster. So [facility] [phone].    ATENCIÓN: Si habla español, tiene a hernandez disposición servicios gratuitos de asistencia lingüística. Llame al [phone].    We comply with applicable federal civil rights laws and [place] laws. We do not discriminate on the basis of race, color, national origin, age, disability sex, sexual orientation or gender identity.            Thank you!     Thank you for choosing [facility]  for your care. Our goal is always to provide you with excellent care. Hearing back from our patients is one way we can continue to improve our services. Please take a few minutes to complete the written survey that you may receive in the mail after your visit with us. Thank you!             Your Updated Medication List - Protect others around you: Learn how to safely use, store and throw away your medicines at www.disposemymeds.org.          This list is accurate as of: 9/22/17  8:59 AM.  Always use your most recent med list.                   Brand Name Dispense Instructions for use Diagnosis    alum & mag hydroxide-simethicone 200-200-20 MG/5ML Susp suspension    MYLANTA/MAALOX     Take 30 mLs by mouth every 6 hours as needed for indigestion        cephALEXin 500 MG capsule    KEFLEX    14 capsule    Take 1 capsule (500 mg) by mouth 2 times daily for 7 days    Laceration of right hand, foreign body presence unspecified, subsequent encounter       ciprofloxacin 250 MG tablet    CIPRO     Take 250 mg by mouth 2 times daily        guaiFENesin 20 mg/mL Soln solution    ROBITUSSIN     Take 10 mLs by mouth every 4 hours as needed for cough        IBUPROFEN PO      Take 200-400 mg by mouth every 6 hours as needed for moderate pain         loratadine 10 MG tablet    CLARITIN     Take 10 mg by mouth daily as needed for allergies        MELATONIN PO      Take 3 mg by mouth nightly as needed        neomycin-bacitracin-polymyxin 5-400-5000 ointment     3.5 g    Apply topically 2 times daily    Laceration of right hand, foreign body presence unspecified, subsequent encounter       NUTRICAP PO      Take 1 packet by mouth once        phenol-menthol 14.5 MG lozenge      Place 1 lozenge inside cheek every 2 hours as needed for moderate pain        senna-docusate 8.6-50 MG per tablet    SENOKOT-S;PERICOLACE     Take 2 tablets by mouth daily as needed for constipation        TYLENOL PO      Take 325-650 mg by mouth every 4 hours as needed for mild pain or fever

## 2017-09-22 NOTE — PROGRESS NOTES
SUBJECTIVE:                                                    Mayte Adame is a 37 year old female with a diagnosis of chemical dependency currently undergoing treatment at [facility] who presents to clinic today for removal of sutures to her right hand.    Patient states she punched through a glass window one week ago and had sutures placed to her right dorsal surface of her hand. She has been applying antibiotic ointment 2-3 times per day. She denies any drainage, significant pain or bleeding from the area. She denies any fevers or chills. She had the sutures placed seven days ago. She states she just finished Ciprofloxacin for a UTI and symptoms have improved.                     Social History   Substance Use Topics     Smoking status: Never Smoker     Smokeless tobacco: Never Used     Alcohol use No        Problem list and histories reviewed & adjusted, as indicated.  Additional history: as documented    Patient Active Problem List   Diagnosis     CARDIOVASCULAR SCREENING; LDL GOAL LESS THAN 160     Chemical dependency (H)     ISAAC III (cervical intraepithelial neoplasia III)     BMI 38.0-38.9,adult     Major depressive disorder, recurrent episode, moderate (H)     Past Surgical History:   Procedure Laterality Date     ABDOMEN SURGERY   and     C-sections     CONIZATION      abnormal pap smears     TUBAL LIGATION         Social History   Substance Use Topics     Smoking status: Never Smoker     Smokeless tobacco: Never Used     Alcohol use No     Family History   Problem Relation Age of Onset     Alcohol/Drug Mother 49     alcohol poisening      Alcohol/Drug Father 44      overdose     Alcohol/Drug Maternal Grandmother      Depression Father      Depression Mother      Substance Abuse Mother      Substance Abuse Father      Substance Abuse Maternal Grandfather      Substance Abuse Maternal Grandmother      Substance Abuse Brother      Bleeding Disorder Brother      Unknown/Adopted  Paternal Grandmother      Unknown/Adopted Paternal Grandfather            Current Outpatient Prescriptions   Medication Sig Dispense Refill     ciprofloxacin (CIPRO) 250 MG tablet Take 250 mg by mouth 2 times daily       IBUPROFEN PO Take 200-400 mg by mouth every 6 hours as needed for moderate pain       Acetaminophen (TYLENOL PO) Take 325-650 mg by mouth every 4 hours as needed for mild pain or fever       alum & mag hydroxide-simethicone (MYLANTA/MAALOX) 200-200-20 MG/5ML SUSP suspension Take 30 mLs by mouth every 6 hours as needed for indigestion       guaiFENesin (ROBITUSSIN) 20 mg/mL SOLN solution Take 10 mLs by mouth every 4 hours as needed for cough       phenol-menthol (CEPASTAT) 14.5 MG lozenge Place 1 lozenge inside cheek every 2 hours as needed for moderate pain       loratadine (CLARITIN) 10 MG tablet Take 10 mg by mouth daily as needed for allergies       senna-docusate (SENOKOT-S;PERICOLACE) 8.6-50 MG per tablet Take 2 tablets by mouth daily as needed for constipation       MELATONIN PO Take 3 mg by mouth nightly as needed       Multiple Vitamins-Minerals (NUTRICAP PO) Take 1 packet by mouth once       No Known Allergies  No lab results found.   BP Readings from Last 3 Encounters:   07/09/17 120/80   01/28/16 126/89   01/14/16 121/82    Wt Readings from Last 3 Encounters:   07/09/17 136 lb (61.7 kg)   01/28/16 181 lb 8 oz (82.3 kg)   01/14/16 188 lb 14.4 oz (85.7 kg)        Labs reviewed in EPIC  Problem list, Medication list, Allergies, and Medical/Social/Surgical histories reviewed in [facility] and updated as appropriate.     ROS: Constitutional, neuro, ENT, endocrine, pulmonary, cardiac, gastrointestinal, genitourinary, musculoskeletal, integument and psychiatric systems are negative, except as otherwise noted above in the HPI.       OBJECTIVE:                                                    There were no vitals taken for this visit.  There is no height or weight on file to calculate BMI.  Skin:  approx 3-4 cm laceration to dorsum of right hand. Small area of erythema to lateral aspect of laceration. Six interrupted prolene sutures removed     Suture removal:     Date sutures applied: 9/15/17         Where (setting) in which they applied:Urgent Care visit    Description:  Type: sutures  Location: right hand    History:    Cause of laceration: patient punched a window    Accompanying Signs & Symptoms: (staff: if yes-describe)  Redness: no  Warmth: no  Drainage: no  Still bleeding: no  Fevers: no    Last tetanus shot: last tetanus booster within 10 years               ASSESSMENT/PLAN:                                                      (S61.610D) Laceration of right hand, foreign body presence unspecified, subsequent encounter  (primary encounter diagnosis)  Comment: sutures removed as above. Will start Keflex x one week for prophylaxis. Patient to apply Neosporin BID and to keep area covered.  Plan: neomycin-bacitracin-polymyxin (NEOSPORIN)         5-400-5000 ointment, cephALEXin (KEFLEX) 500 MG        capsule            (F19.20) Chemical dependency (H)  Comment: encouraged to continue working on recover  Plan: as above      Patient Instructions:  Patient Instructions   Keep covered, apply Neosporin twice per day  Start Keflex 500 mg twice per day for one week  Come back if it starts to ooze or turn more red or painful                [facility]

## 2017-09-22 NOTE — PROGRESS NOTES
Patient Safety Plan Template    Name:   Mayte Adame YOB: 1980 Age:  37 year old MR Number:  [id number]   Step 1: Warning signs (Thoughts, images, mood, situation, behavior) that a crisis may be developin.  Back and White Thinking--irritation   2.   Over-reacting, crying, overly sensitive   3.   Not taking responsibility for my actions   Step 2: Internal coping strategies - Things I can do to take my mind off of my problems without contacting another person (relaxation technique, physical activity):     1.  Exercise! Use machines/walk   2.   Be still-meditation/pryaer   3.   Journal   Step 3: People and social settings that provide distraction:     1. Name:   Puja Sherman Phone:  [phone]   2. Name:  Evelin Jose (anil) Phone:   [phone]   3. Place:   [facility] 4. Place:   [facility]     The one thing that is most important to me and worth living for is: my kids-being present in their lives     Step 4: People whom I can ask for help:     1. Name:  Otto Min Phone:   [phone]   2. Name:   Chauncey Freeman Phone:   [phone]   3. Name:  Zak Brina Phone:  [phone]   Step 5: Professionals or agencies I can contact during a crisis:     1. Clinician Name:  Flalon RYDER (therapist) Phone:  [phone]   Clinician Pager or Emergency Contact #:   N/A   2. Clinician Name:   N/A Phone:   N/A   Clinician Pager or Emergency Contact #:   N/A   3. Local Urgent Care Services:  [facility]  Urgent Care Services Address:   [address] [facility]  Urgent Care Services Phone:     4. Suicide Prevention Lifeline Phone: 0-707-681-AGDL (5308)     Step 6: Making the environment safe:     1.   Block or unfollow using people.   2.   Set boundaries with relationships.   Safety Plan Template 2008 Lucila Burger and Jose Foy is reprinted with the express permission of the authors.  No portion of the Safety Plan Template may be reproduced without the express, written permission.   You can contact the authors at [email or web address] or [email or web address].

## 2017-09-22 NOTE — PROGRESS NOTES
"9/22/17  Pt shared her \"Book of Me - Affirmations\" assignment re-framing negative self-talk statements to positive and identifying some of her accomplishments, strengths and qualities.  Positives that Pt identified included being outgoing, a leader, compassionate, intuitive, open minded and capable.  Pt identified her tendency toward co-dependency.  Pt was open to receive feedback and support from peers and counselors.  Pt to practice positive affirmations daily.  "

## 2017-09-22 NOTE — PATIENT INSTRUCTIONS
Keep covered, apply Neosporin twice per day  Start Keflex 500 mg twice per day for one week  Come back if it starts to ooze or turn more red or painful

## 2017-09-22 NOTE — NURSING NOTE
"Chief Complaint   Patient presents with     RECHECK       Initial /64  Pulse 86  Temp 97.8  F (36.6  C) (Oral)  Resp 16  Wt 144 lb (65.3 kg)  SpO2 96%  BMI 29.08 kg/m2 Estimated body mass index is 29.08 kg/(m^2) as calculated from the following:    Height as of 1/28/16: 4' 11\" (1.499 m).    Weight as of this encounter: 144 lb (65.3 kg).  Medication Reconciliation: complete     Steven Valencia, EDELMIRA    "

## 2017-09-23 ENCOUNTER — HOSPITAL ENCOUNTER (OUTPATIENT)
Dept: BEHAVIORAL HEALTH | Facility: CLINIC | Age: 37
End: 2017-09-23
Attending: FAMILY MEDICINE
Payer: COMMERCIAL

## 2017-09-23 PROCEDURE — H2035 A/D TX PROGRAM, PER HOUR: HCPCS | Mod: HQ

## 2017-09-23 PROCEDURE — 10020000 ZZH LODGING PLUS FACILITY CHARGE ADULT

## 2017-09-24 ENCOUNTER — HOSPITAL ENCOUNTER (OUTPATIENT)
Dept: BEHAVIORAL HEALTH | Facility: CLINIC | Age: 37
End: 2017-09-24
Attending: FAMILY MEDICINE
Payer: COMMERCIAL

## 2017-09-24 PROCEDURE — 10020000 ZZH LODGING PLUS FACILITY CHARGE ADULT

## 2017-09-24 PROCEDURE — H2035 A/D TX PROGRAM, PER HOUR: HCPCS | Mod: HQ

## 2017-09-25 ENCOUNTER — HOSPITAL ENCOUNTER (OUTPATIENT)
Dept: BEHAVIORAL HEALTH | Facility: CLINIC | Age: 37
End: 2017-09-25
Attending: FAMILY MEDICINE
Payer: COMMERCIAL

## 2017-09-25 PROCEDURE — 10020000 ZZH LODGING PLUS FACILITY CHARGE ADULT

## 2017-09-25 PROCEDURE — H2035 A/D TX PROGRAM, PER HOUR: HCPCS | Mod: HQ

## 2017-09-25 NOTE — PROGRESS NOTES
"Patient:  Mayte Adame            Adult CD Progress Note and Treatment Plan Review     Attendance  Please refer to OP BEH CD Adult Attendance Record Documentation Flowsheet    Support group attended this week: yes    Reporting sobriety:  yes    Treatment Plan     Treatment Plan Review competed on: 9/25/17       Client preferred learning style: Visual  Hands on  Verbal  Demonstration    Staff Members contributing HARDIK Edwards; HARDIK Hull                     Received Supervision: No    Client: contributed to goals and plan.    Client received copy of plan/revised plan: Yes    Client agrees with plan/revised plan: Yes        Changes to Treatment Plan: No    New Goals added since last review None added    Goals worked on since last review Relationship workshop, \"Book of Me\" spirituality, education on mental health and addiction, sober supportive, coping skills    Strategies effective: yes    Strategies need these changes: None needed    ASAM Risk Rating:    Dimension 1 1 Patient reports her last use date as 9/15/17. Patient reports current withdrawal symptoms include fatigue     Dimension 2 1 Patient had stitches removed on her right hand this week. Patient is able to seek medical services as needed independently.     Dimension 3 2 Pt reports increased irritability and depresssion related to her boyfriend not attending LP visiting hours. Pt appears to struggle with co-dependent behaviors and difficulty with managing negative emotions. Patient denies current suicidal ideation.     Dimension 4 2 Patient reports her daughter and getting her confidence back are her primary motivators for treatment and sobriety.     Dimension 5 4 Patient reports her cravings are a 6/10 (10 highest) this week. Patient reports she tries to stay busy as a way of distracting herself from urges. Patient reports trying to utilize positive self-talk as a new coping skill.       Dimension 6 4 Patient is attending 12-step " meetings and building relationships with female peers. Patient reports she plans to attend outpatient at [facility] after discharge.      Any changes in Vulnerable Adult Status?  No  If yes, add to treatment plan and individual abuse prevention plan.    Family Involvement:   none schedule this week    Data:   good insight client did participate client did actively participate      Intervention:   Aftercare planning  Behavior modification  Cognitive Behavioral Therapy  Counselor feedback  Education  Emotional management  Group feedback  Motivational Enhancement Therapy  Relapse prevention  Twelve Step facilitation  Mental health education      Assessment:   Stages of Change Model  Contemplation    Appears/Sounds:  Cooperative  Motivated  Engaged  Ambivalent  Anxious  Depressed    Plan:  Focus on recovery environment  Monitor emotional/physical health      HARDIK Hull

## 2017-09-26 ENCOUNTER — HOSPITAL ENCOUNTER (OUTPATIENT)
Dept: BEHAVIORAL HEALTH | Facility: CLINIC | Age: 37
End: 2017-09-26
Attending: FAMILY MEDICINE
Payer: COMMERCIAL

## 2017-09-26 PROCEDURE — 10020000 ZZH LODGING PLUS FACILITY CHARGE ADULT

## 2017-09-26 PROCEDURE — H2035 A/D TX PROGRAM, PER HOUR: HCPCS | Mod: HQ

## 2017-09-27 ENCOUNTER — HOSPITAL ENCOUNTER (OUTPATIENT)
Dept: BEHAVIORAL HEALTH | Facility: CLINIC | Age: 37
End: 2017-09-27
Attending: FAMILY MEDICINE
Payer: COMMERCIAL

## 2017-09-27 ENCOUNTER — OFFICE VISIT (OUTPATIENT)
Dept: BEHAVIORAL HEALTH | Facility: CLINIC | Age: 37
End: 2017-09-27
Payer: COMMERCIAL

## 2017-09-27 VITALS
SYSTOLIC BLOOD PRESSURE: 100 MMHG | WEIGHT: 148 LBS | RESPIRATION RATE: 16 BRPM | DIASTOLIC BLOOD PRESSURE: 70 MMHG | TEMPERATURE: 97.5 F | BODY MASS INDEX: 29.89 KG/M2 | HEART RATE: 84 BPM | OXYGEN SATURATION: 99 %

## 2017-09-27 DIAGNOSIS — J30.2 CHRONIC SEASONAL ALLERGIC RHINITIS DUE TO OTHER ALLERGEN: Primary | ICD-10-CM

## 2017-09-27 DIAGNOSIS — F19.20 CHEMICAL DEPENDENCY (H): ICD-10-CM

## 2017-09-27 DIAGNOSIS — S61.411D LACERATION OF RIGHT HAND WITHOUT FOREIGN BODY, SUBSEQUENT ENCOUNTER: ICD-10-CM

## 2017-09-27 DIAGNOSIS — F33.1 MAJOR DEPRESSIVE DISORDER, RECURRENT EPISODE, MODERATE (H): ICD-10-CM

## 2017-09-27 PROCEDURE — 99214 OFFICE O/P EST MOD 30 MIN: CPT | Performed by: NURSE PRACTITIONER

## 2017-09-27 PROCEDURE — H2035 A/D TX PROGRAM, PER HOUR: HCPCS | Mod: HQ

## 2017-09-27 PROCEDURE — 10020000 ZZH LODGING PLUS FACILITY CHARGE ADULT

## 2017-09-27 RX ORDER — LORATADINE 10 MG/1
10 TABLET ORAL DAILY
Qty: 30 TABLET | Refills: 1 | Status: SHIPPED | OUTPATIENT
Start: 2017-09-27

## 2017-09-27 RX ORDER — FLUTICASONE PROPIONATE 50 MCG
1-2 SPRAY, SUSPENSION (ML) NASAL DAILY
Qty: 1 BOTTLE | Refills: 11 | Status: SHIPPED | OUTPATIENT
Start: 2017-09-27

## 2017-09-27 NOTE — PROGRESS NOTES
SUBJECTIVE:                                                    Mayte Adame is a 37 year old female currently undergoing chemical dependency treatment at [facility] who presents to clinic today for evaluation of a fourteen day history of rhinorrhea and occasionally productive cough. She denies any headaches or fevers. She has tried nothing for relief. Denies any wheezing. She states she thinks she may have seasonal allergies    Patient also saw me on  for removal of sutures to her right hand laceration. She is on a seven day course of Keflex for cellulitis prophylaxis and some localized erythema. Patient reports the wound is healing without complication. Denies drainage, redness, or significant pain.              Social History   Substance Use Topics     Smoking status: Never Smoker     Smokeless tobacco: Never Used     Alcohol use No        Problem list and histories reviewed & adjusted, as indicated.  Additional history: as documented    Patient Active Problem List   Diagnosis     CARDIOVASCULAR SCREENING; LDL GOAL LESS THAN 160     Chemical dependency (H)     ISAAC III (cervical intraepithelial neoplasia III)     BMI 38.0-38.9,adult     Major depressive disorder, recurrent episode, moderate (H)     Past Surgical History:   Procedure Laterality Date     ABDOMEN SURGERY   and     C-sections     CONIZATION      abnormal pap smears     TUBAL LIGATION         Social History   Substance Use Topics     Smoking status: Never Smoker     Smokeless tobacco: Never Used     Alcohol use No     Family History   Problem Relation Age of Onset     Alcohol/Drug Mother 49     alcohol poisening      Depression Mother      Substance Abuse Mother      Alcohol/Drug Father 44      overdose     Depression Father      Substance Abuse Father      Alcohol/Drug Maternal Grandmother      Substance Abuse Maternal Grandmother      Substance Abuse Maternal Grandfather      Unknown/Adopted Paternal Grandmother       Unknown/Adopted Paternal Grandfather      Substance Abuse Brother      Bleeding Disorder Brother            Current Outpatient Prescriptions   Medication Sig Dispense Refill     neomycin-bacitracin-polymyxin (NEOSPORIN) 5-400-5000 ointment Apply topically 2 times daily 3.5 g 0     cephALEXin (KEFLEX) 500 MG capsule Take 1 capsule (500 mg) by mouth 2 times daily for 7 days 14 capsule 0     ciprofloxacin (CIPRO) 250 MG tablet Take 250 mg by mouth 2 times daily       IBUPROFEN PO Take 200-400 mg by mouth every 6 hours as needed for moderate pain       Acetaminophen (TYLENOL PO) Take 325-650 mg by mouth every 4 hours as needed for mild pain or fever       alum & mag hydroxide-simethicone (MYLANTA/MAALOX) 200-200-20 MG/5ML SUSP suspension Take 30 mLs by mouth every 6 hours as needed for indigestion       guaiFENesin (ROBITUSSIN) 20 mg/mL SOLN solution Take 10 mLs by mouth every 4 hours as needed for cough       phenol-menthol (CEPASTAT) 14.5 MG lozenge Place 1 lozenge inside cheek every 2 hours as needed for moderate pain       loratadine (CLARITIN) 10 MG tablet Take 10 mg by mouth daily as needed for allergies       senna-docusate (SENOKOT-S;PERICOLACE) 8.6-50 MG per tablet Take 2 tablets by mouth daily as needed for constipation       MELATONIN PO Take 3 mg by mouth nightly as needed       Multiple Vitamins-Minerals (NUTRICAP PO) Take 1 packet by mouth once       No Known Allergies  No lab results found.   BP Readings from Last 3 Encounters:   09/22/17 100/64   07/09/17 120/80   01/28/16 126/89    Wt Readings from Last 3 Encounters:   09/22/17 144 lb (65.3 kg)   07/09/17 136 lb (61.7 kg)   01/28/16 181 lb 8 oz (82.3 kg)        Labs reviewed in EPIC  Problem list, Medication list, Allergies, and Medical/Social/Surgical histories reviewed in [facility] and updated as appropriate.     ROS: Constitutional, neuro, ENT, endocrine, pulmonary, cardiac, gastrointestinal, genitourinary, musculoskeletal, integument and psychiatric  systems are negative, except as otherwise noted above in the HPI.       OBJECTIVE:                                                    There were no vitals taken for this visit.  There is no height or weight on file to calculate BMI.  GENERAL: healthy, alert, well nourished, well hydrated, no distress  EYES: Eyes grossly normal to inspection, extraocular movements - intact, and PERRL  HENT: ear canals- normal; TMs NL Nares- boggy swollen pale turbinates; Mouth- no ulcers, no lesions  NECK: no tenderness, no adenopathy, no asymmetry, no masses, no stiffness; thyroid- normal to palpation. Some drainage to posterior pharynx  RESP: lungs clear to auscultation - no rales, no rhonchi, no wheezes  Skin: approx 3 cm laceration to dorsum of right hand. Edges well approximated, no drainage, scant erythema   Memory:  intact       ASSESSMENT/PLAN:                                                      (J30.2) Chronic seasonal allergic rhinitis due to other allergen  (primary encounter diagnosis)  Comment: the presentation is consistent with seasonal allergies, patient to trial Claritin and Flonase consistently until the first hard freeze and come back if worsens or no improvement  Plan: loratadine (CLARITIN) 10 MG tablet, fluticasone        (FLONASE) 50 MCG/ACT spray            (F19.20) Chemical dependency (H)  Comment: Currently in treatment  Plan: Encouragement provided    (F33.1) Major depressive disorder, recurrent episode, moderate (H)  Comment: Appears controlled  Plan: Support given    (S61.411D) Laceration of right hand without foreign body, subsequent encounter  Comment: healing well  Plan: Continue Keflex          Patient Instructions:  Patient Instructions   Start Claritin and Flonase for seasonal allergies until the first hard freeze  Please come back if it gets worse or no improvement in 4-5 days                ANA Disla [facility]

## 2017-09-27 NOTE — PATIENT INSTRUCTIONS
Start Claritin and Flonase for seasonal allergies until the first hard freeze  Please come back if it gets worse or no improvement in 4-5 days

## 2017-09-27 NOTE — NURSING NOTE
"Chief Complaint   Patient presents with     RECHECK       Initial /70  Pulse 84  Resp 16  Wt 148 lb (67.1 kg)  SpO2 99%  BMI 29.89 kg/m2 Estimated body mass index is 29.89 kg/(m^2) as calculated from the following:    Height as of 1/28/16: 4' 11\" (1.499 m).    Weight as of this encounter: 148 lb (67.1 kg).  Medication Reconciliation: complete     Steven Valencia CMA    "

## 2017-09-27 NOTE — PROGRESS NOTES
9/27/17  Pt shared an assignment on the self-esteem/loneliness connection.  She identified how her lack of healthy esteem has interfered with her ability to connect with healthy individuals.  Pt identified some of her negative thoughts, the costs of negative thoughts, her achievements, self care practices and activities to increase healthy esteem.   Pt to practice the positives daily.

## 2017-09-27 NOTE — MR AVS SNAPSHOT
After Visit Summary   9/27/2017    Mayte Adame    MRN: [id number]           Patient Information     Date Of Birth          1980        Visit Information        Provider Department      9/27/2017 9:30 AM Ramandeep Simons APRN [facility]        Today's Diagnoses     Chronic seasonal allergic rhinitis due to other allergen    -  1      Care Instructions    Start Claritin and Flonase for seasonal allergies until the first hard freeze  Please come back if it gets worse or no improvement in 4-5 days          Follow-ups after your visit        Your next 10 appointments already scheduled     Sep 28, 2017  7:15 AM CDT   Treatment with ADULT LODGING PLUS E   [facility] ([facility])    [address] [place] [address]   [phone]            Sep 29, 2017  7:15 AM CDT   Treatment with ADULT LODGING PLUS E   [facility] ([facility])    [address] [place] [address]   [phone]            Sep 30, 2017  7:15 AM CDT   Treatment with ADULT LODGING PLUS E   [facility] ([facility])    [address] [place] [address]   [phone]            Oct 01, 2017  7:15 AM CDT   Treatment with ADULT LODGING PLUS E   [facility] ([facility])    [address] [place] [address]   [phone]            Oct 02, 2017  7:15 AM CDT   Treatment with ADULT LODGING PLUS E   [facility] ([facility])    [address] [place] [address]   [phone]            Oct 03, 2017  7:15 AM CDT   Treatment with ADULT LODGING PLUS E   [facility] ([facility])    [address] [address]   [phone]            Oct 04, 2017  7:15 AM CDT   Treatment with ADULT LODGING PLUS E   [facility] ([facility])    [address] [address]   [phone]            Oct 05, 2017  7:15 AM CDT   Treatment with ADULT LODGING PLUS E   [facility] [address] [address]   [phone]            Oct 06, 2017  7:15 AM CDT   Treatment with ADULT LODGING PLUS E   [facility]    [facility] [address] [address]   [phone]            Oct 07, 2017  7:15 AM CDT   Treatment with ADULT LODGING PLUS E   [facility])    [facility] [address] [address]   [phone]              Who to contact     If you have questions or need follow up information about today's clinic visit or your schedule please contact [facility] directly at [phone].  Normal or non-critical lab and imaging results will be communicated to you by MyChart, letter or phone within 4 business days after the clinic has received the results. If you do not hear from us within 7 days, please contact the clinic through [employer] or phone. If you have a critical or abnormal lab result, we will notify you by phone as soon as possible.  Submit refill requests through [employer] or call your pharmacy and they will forward the refill request to us. Please allow 3 business days for your refill to be completed.          Additional Information About Your Visit        [employer] Information     [employer] gives you secure access to your electronic health record. If you see a primary care provider, you  can also send messages to your care team and make appointments. If you have questions, please call your primary care clinic.  If you do not have a primary care provider, please call [phone] and they will assist you.        Care EveryWhere ID     This is your Care EveryWhere ID. This could be used by other organizations to access your [place] medical records  [id number]        Your Vitals Were     Pulse Temperature Respirations Pulse Oximetry BMI (Body Mass Index)       84 97.5  F (36.4  C) (Oral) 16 99% 29.89 kg/m2        Blood Pressure from Last 3 Encounters:   09/27/17 100/70   09/22/17 100/64   07/09/17 120/80    Weight from Last 3 Encounters:   09/27/17 148 lb (67.1 kg)   09/22/17 144 lb (65.3 kg)   07/09/17 136 lb (61.7 kg)              Today, you had the following     No orders found for display         Today's Medication Changes          These changes are accurate as of: 9/27/17  9:56 AM.  If you have any questions, ask your nurse or doctor.               Start taking these medicines.        Dose/Directions    fluticasone 50 MCG/ACT spray   Commonly known as:  FLONASE   Used for:  Chronic seasonal allergic rhinitis due to other allergen   Started by:  Ramandeep Simons APRN CNP        Dose:  1-2 spray   Spray 1-2 sprays into both nostrils daily   Quantity:  1 Bottle   Refills:  11         These medicines have changed or have updated prescriptions.        Dose/Directions    * loratadine 10 MG tablet   Commonly known as:  CLARITIN   This may have changed:  Another medication with the same name was added. Make sure you understand how and when to take each.        Dose:  10 mg   Take 10 mg by mouth daily as needed for allergies   Refills:  0       * loratadine 10 MG tablet   Commonly known as:  CLARITIN   This may have changed:  You were already taking a medication with the same name, and this prescription was added. Make sure you understand how and when to take each.   Used for:  Chronic seasonal  allergic rhinitis due to other allergen   Changed by:  Ramandeep Simons APRN CNP        Dose:  10 mg   Take 1 tablet (10 mg) by mouth daily   Quantity:  30 tablet   Refills:  1       * Notice:  This list has 2 medication(s) that are the same as other medications prescribed for you. Read the directions carefully, and ask your doctor or other care provider to review them with you.         Where to get your medicines      These medications were sent to [facility] [place], [place] - [address]  [address] [facility] [address], [facility] [address]     Phone:  [phone]     fluticasone 50 MCG/ACT spray    loratadine 10 MG tablet                Primary Care Provider    None Specified       No primary provider on file.        Equal Access to Services     [facility]: Ky Hernandez, waadams larson, qatod kaalmada tye, ruddy pearce . So [facility] [phone].    ATENCIÓN: Si habla español, tiene a hernandez disposición servicios gratuitos de asistencia lingüística. Ll[facility] [phone].    We comply with applicable federal civil rights laws and [place] laws. We do not discriminate on the basis of race, color, national origin, age, disability sex, sexual orientation or gender identity.            Thank you!     Thank you for choosing [facility]  for your care. Our goal is always to provide you with excellent care. Hearing back from our patients is one way we can continue to improve our services. Please take a few minutes to complete the written survey that you may receive in the mail after your visit with us. Thank you!             Your Updated Medication List - Protect others around you: Learn how to safely use, store and throw away your medicines at www.disposemymeds.org.          This list is accurate as of: 9/27/17  9:56 AM.  Always use your most recent med list.                   Brand Name Dispense Instructions for use  Diagnosis    alum & mag hydroxide-simethicone 200-200-20 MG/5ML Susp suspension    MYLANTA/MAALOX     Take 30 mLs by mouth every 6 hours as needed for indigestion        cephALEXin 500 MG capsule    KEFLEX    14 capsule    Take 1 capsule (500 mg) by mouth 2 times daily for 7 days    Laceration of right hand, foreign body presence unspecified, subsequent encounter       ciprofloxacin 250 MG tablet    CIPRO     Take 250 mg by mouth 2 times daily        fluticasone 50 MCG/ACT spray    FLONASE    1 Bottle    Spray 1-2 sprays into both nostrils daily    Chronic seasonal allergic rhinitis due to other allergen       guaiFENesin 20 mg/mL Soln solution    ROBITUSSIN     Take 10 mLs by mouth every 4 hours as needed for cough        IBUPROFEN PO      Take 200-400 mg by mouth every 6 hours as needed for moderate pain        * loratadine 10 MG tablet    CLARITIN     Take 10 mg by mouth daily as needed for allergies        * loratadine 10 MG tablet    CLARITIN    30 tablet    Take 1 tablet (10 mg) by mouth daily    Chronic seasonal allergic rhinitis due to other allergen       MELATONIN PO      Take 3 mg by mouth nightly as needed        neomycin-bacitracin-polymyxin 5-400-5000 ointment     3.5 g    Apply topically 2 times daily    Laceration of right hand, foreign body presence unspecified, subsequent encounter       NUTRICAP PO      Take 1 packet by mouth once        phenol-menthol 14.5 MG lozenge      Place 1 lozenge inside cheek every 2 hours as needed for moderate pain        senna-docusate 8.6-50 MG per tablet    SENOKOT-S;PERICOLACE     Take 2 tablets by mouth daily as needed for constipation        TYLENOL PO      Take 325-650 mg by mouth every 4 hours as needed for mild pain or fever        * Notice:  This list has 2 medication(s) that are the same as other medications prescribed for you. Read the directions carefully, and ask your doctor or other care provider to review them with you.

## 2017-09-28 ENCOUNTER — HOSPITAL ENCOUNTER (OUTPATIENT)
Dept: BEHAVIORAL HEALTH | Facility: CLINIC | Age: 37
End: 2017-09-28
Attending: FAMILY MEDICINE
Payer: COMMERCIAL

## 2017-09-28 PROCEDURE — H2035 A/D TX PROGRAM, PER HOUR: HCPCS | Mod: HQ

## 2017-09-28 PROCEDURE — 10020000 ZZH LODGING PLUS FACILITY CHARGE ADULT

## 2017-09-29 ENCOUNTER — HOSPITAL ENCOUNTER (OUTPATIENT)
Dept: BEHAVIORAL HEALTH | Facility: CLINIC | Age: 37
End: 2017-09-29
Attending: FAMILY MEDICINE
Payer: COMMERCIAL

## 2017-09-29 PROCEDURE — 10020000 ZZH LODGING PLUS FACILITY CHARGE ADULT

## 2017-09-29 PROCEDURE — H2035 A/D TX PROGRAM, PER HOUR: HCPCS | Mod: HQ

## 2017-09-29 PROCEDURE — 90792 PSYCH DIAG EVAL W/MED SRVCS: CPT | Performed by: PSYCHIATRY & NEUROLOGY
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
## 2017-09-30 ENCOUNTER — HOSPITAL ENCOUNTER (OUTPATIENT)
Dept: BEHAVIORAL HEALTH | Facility: CLINIC | Age: 37
End: 2017-09-30
Attending: FAMILY MEDICINE
Payer: COMMERCIAL

## 2017-09-30 PROCEDURE — H2035 A/D TX PROGRAM, PER HOUR: HCPCS | Mod: HQ

## 2017-09-30 PROCEDURE — 10020000 ZZH LODGING PLUS FACILITY CHARGE ADULT

## 2017-10-01 ENCOUNTER — HOSPITAL ENCOUNTER (OUTPATIENT)
Dept: BEHAVIORAL HEALTH | Facility: CLINIC | Age: 37
End: 2017-10-01
Attending: FAMILY MEDICINE
Payer: COMMERCIAL

## 2017-10-01 PROCEDURE — H2035 A/D TX PROGRAM, PER HOUR: HCPCS | Mod: HQ

## 2017-10-01 PROCEDURE — 10020000 ZZH LODGING PLUS FACILITY CHARGE ADULT

## 2017-10-02 ENCOUNTER — HOSPITAL ENCOUNTER (OUTPATIENT)
Dept: BEHAVIORAL HEALTH | Facility: CLINIC | Age: 37
End: 2017-10-02
Attending: FAMILY MEDICINE
Payer: COMMERCIAL

## 2017-10-02 PROCEDURE — H2035 A/D TX PROGRAM, PER HOUR: HCPCS | Mod: HQ

## 2017-10-02 PROCEDURE — 10020000 ZZH LODGING PLUS FACILITY CHARGE ADULT
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
## 2017-10-03 ENCOUNTER — HOSPITAL ENCOUNTER (OUTPATIENT)
Dept: BEHAVIORAL HEALTH | Facility: CLINIC | Age: 37
End: 2017-10-03
Attending: FAMILY MEDICINE
Payer: COMMERCIAL

## 2017-10-03 PROCEDURE — H2035 A/D TX PROGRAM, PER HOUR: HCPCS | Mod: HQ

## 2017-10-03 PROCEDURE — 10020000 ZZH LODGING PLUS FACILITY CHARGE ADULT

## 2017-10-03 NOTE — PROGRESS NOTES
10/3/17  Counselor faxed a release of information and left a voice message for Hank, from [facility], regarding Pt's request for entry to the OP program following completion of [facility].

## 2017-10-04 ENCOUNTER — TELEPHONE (OUTPATIENT)
Dept: BEHAVIORAL HEALTH | Facility: CLINIC | Age: 37
End: 2017-10-04

## 2017-10-04 ENCOUNTER — HOSPITAL ENCOUNTER (OUTPATIENT)
Dept: BEHAVIORAL HEALTH | Facility: CLINIC | Age: 37
End: 2017-10-04
Attending: FAMILY MEDICINE
Payer: COMMERCIAL

## 2017-10-04 PROCEDURE — H2035 A/D TX PROGRAM, PER HOUR: HCPCS | Mod: HQ

## 2017-10-04 PROCEDURE — 10020000 ZZH LODGING PLUS FACILITY CHARGE ADULT

## 2017-10-04 NOTE — TELEPHONE ENCOUNTER
A phone call was made as follow-up to the Family Program mailing for the week of 10/9/17. Message left or spoke in person to individuals on JOC[facility].

## 2017-10-05 ENCOUNTER — HOSPITAL ENCOUNTER (OUTPATIENT)
Dept: BEHAVIORAL HEALTH | Facility: CLINIC | Age: 37
End: 2017-10-05
Attending: FAMILY MEDICINE
Payer: COMMERCIAL

## 2017-10-05 PROCEDURE — 10020000 ZZH LODGING PLUS FACILITY CHARGE ADULT

## 2017-10-05 PROCEDURE — 99214 OFFICE O/P EST MOD 30 MIN: CPT | Performed by: PSYCHIATRY & NEUROLOGY

## 2017-10-05 PROCEDURE — H2035 A/D TX PROGRAM, PER HOUR: HCPCS | Mod: HQ

## 2017-10-05 NOTE — PROGRESS NOTES
Interim history   This is a 37 year old female with 1.  Binge eating disorder.   2.  Methamphetamine use disorder.     3.  GHB disorder.   4.  Marijuana use disorder, severe.   5.  Methamphetamine-use disorder.    6.  Trichotillomania.     .Pt seen . Patient's mood is good  Still binges, wants to be on wellbutrin , as it worked for her before  Some ocd like tendencies, -checks doors,clean , symmetry --takes more time  Energy Level:ok  Sleep:poor   Appetite:normal motivation interest are good   Denied any Suicidal/homicidal ideation/plan intent. Denied psychosis    Tolerating meds and has no side effects.         No prior suicide attempts      Past Medical History:   Diagnosis Date     Addiction to drug (H) 10/2014    meth and marijuana for 12 years treatment     Depressive disorder      Esophageal reflux 2014    Associated with weight gain     Migraines 2015     Urinary tract infection        10 point ROS is negative   constitutional    HEENT - no symptoms   RESPIRATORY-no symptoms   CVS-no symptoms   GI-no symptoms  MUSCKULOSKELETAL -no symptoms  NEUROLOGICAL-no symptoms  ENDOCRINE-no symptoms  HEMATAOLOGY-no symptoms  SKIN-no symptoms  Family History   Problem Relation Age of Onset     Alcohol/Drug Mother 49     alcohol poisening      Depression Mother      Substance Abuse Mother      Alcohol/Drug Father 44      overdose     Depression Father      Substance Abuse Father      Alcohol/Drug Maternal Grandmother      Substance Abuse Maternal Grandmother      Substance Abuse Maternal Grandfather      Unknown/Adopted Paternal Grandmother      Unknown/Adopted Paternal Grandfather      Substance Abuse Brother      Bleeding Disorder Brother      Social History     Social History     Marital status: Single     Spouse name: N/A     Number of children: N/A     Years of education: N/A     Occupational History     Not on file.     Social History Main Topics     Smoking status: Never Smoker      Smokeless tobacco: Never Used     Alcohol use No     Drug use: No      Comment: previous addict MJ and Meth quit 10/2014     Sexual activity: Yes     Partners: Male     Birth control/ protection: Female Surgical     Other Topics Concern     Parent/Sibling W/ Cabg, Mi Or Angioplasty Before 65f 55m? 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  She has 2 children, Mikey who was born in 1999, and Ganga a daughter who was born in 2006.  Her daughter lives with her.  Her son visits occasionally.    SOCIAL HISTORY:  She grew up between [place] and [place].  She has 1 brother.  She has an associate's degree.  She is single.  Support system consists of family, friends.  Stressors include financial, relationships.       The patient's parents  when she was 2.  She lived with mom.  Mom picked up drinking at age of 13 and then dad  from an overdose when she was 12.  Mom  when she was 22.  She still has grief about it.  She has a GED.  She quit school, got 2+ years of education.          Medications:     Current Outpatient Prescriptions   Medication Sig     buPROPion (WELLBUTRIN XL) 150 MG 24 hr tablet Take 1 tablet (150 mg) by mouth every morning     loratadine (CLARITIN) 10 MG tablet Take 1 tablet (10 mg) by mouth daily     fluticasone (FLONASE) 50 MCG/ACT spray Spray 1-2 sprays into both nostrils daily     neomycin-bacitracin-polymyxin (NEOSPORIN) 5-400-5000 ointment Apply topically 2 times daily     ciprofloxacin (CIPRO) 250 MG tablet Take 250 mg by mouth 2 times daily     IBUPROFEN PO Take 200-400 mg by mouth every 6 hours as needed for moderate pain     Acetaminophen (TYLENOL PO) Take 325-650 mg by mouth every 4 hours as needed for mild pain or fever     alum & mag hydroxide-simethicone (MYLANTA/MAALOX) 200-200-20 MG/5ML SUSP suspension Take 30 mLs by mouth every 6 hours as needed for indigestion     guaiFENesin  (ROBITUSSIN) 20 mg/mL SOLN solution Take 10 mLs by mouth every 4 hours as needed for cough     phenol-menthol (CEPASTAT) 14.5 MG lozenge Place 1 lozenge inside cheek every 2 hours as needed for moderate pain     loratadine (CLARITIN) 10 MG tablet Take 10 mg by mouth daily as needed for allergies     senna-docusate (SENOKOT-S;PERICOLACE) 8.6-50 MG per tablet Take 2 tablets by mouth daily as needed for constipation     MELATONIN PO Take 3 mg by mouth nightly as needed     Multiple Vitamins-Minerals (NUTRICAP PO) Take 1 packet by mouth once     No current facility-administered medications for this encounter.      Facility-Administered Medications Ordered in Other Encounters   Medication     Self Administer Medications: Behavioral Services        Current Outpatient Prescriptions on File Prior to Encounter:  buPROPion (WELLBUTRIN XL) 150 MG 24 hr tablet Take 1 tablet (150 mg) by mouth every morning   loratadine (CLARITIN) 10 MG tablet Take 1 tablet (10 mg) by mouth daily   fluticasone (FLONASE) 50 MCG/ACT spray Spray 1-2 sprays into both nostrils daily   neomycin-bacitracin-polymyxin (NEOSPORIN) 5-400-5000 ointment Apply topically 2 times daily   ciprofloxacin (CIPRO) 250 MG tablet Take 250 mg by mouth 2 times daily   IBUPROFEN PO Take 200-400 mg by mouth every 6 hours as needed for moderate pain   Acetaminophen (TYLENOL PO) Take 325-650 mg by mouth every 4 hours as needed for mild pain or fever   alum & mag hydroxide-simethicone (MYLANTA/MAALOX) 200-200-20 MG/5ML SUSP suspension Take 30 mLs by mouth every 6 hours as needed for indigestion   guaiFENesin (ROBITUSSIN) 20 mg/mL SOLN solution Take 10 mLs by mouth every 4 hours as needed for cough   phenol-menthol (CEPASTAT) 14.5 MG lozenge Place 1 lozenge inside cheek every 2 hours as needed for moderate pain   loratadine (CLARITIN) 10 MG tablet Take 10 mg by mouth daily as needed for allergies   senna-docusate (SENOKOT-S;PERICOLACE) 8.6-50 MG per tablet Take 2 tablets by  mouth daily as needed for constipation   MELATONIN PO Take 3 mg by mouth nightly as needed   Multiple Vitamins-Minerals (NUTRICAP PO) Take 1 packet by mouth once     Current Facility-Administered Medications on File Prior to Encounter:  Self Administer Medications: Behavioral Services          Allergies:   No Known Allergies         Psychiatric Examination:     Weight is 0 lbs 0 oz  There is no height or weight on file to calculate BMI.    Appearance:  awake, alert and adequately groomed  Attitude:  cooperative  Eye Contact:  good  Mood:  better  Affect:  appropriate and in normal range and mood congruent  Speech:  clear, coherent rate /rhythm are good  Psychomotor Behavior:  no evidence of tardive dyskinesia, dystonia, or tics and intact station, gait and muscle tone  Throught Process:  logical  Associations:  no loose associations  Thought Content:  no evidence of suicidal ideation or homicidal ideation, no evidence of psychotic thought, no auditory hallucinations present and no visual hallucinations present  Insight:  good  Judgement:  intact  Oriented to:  time, person, and place  Attention Span and Concentration:  intact  Recent and Remote Memory:  intact  Language fund of knowledge are adequate               Labs:   No results found for: NTBNPI, NTBNP  No results found for: WBC, HGB, HCT, MCV, PLT  No results found for: TSH        DIagnoses:            Plan:   Axis I:     1.  Binge eating disorder.   2.  Methamphetamine use disorder.     3.  GHB disorder.   4.  Marijuana use disorder, severe.   5.  Methamphetamine-use disorder.    6.  Trichotillomania.       PLAN:  will start topamax for curbing appetitie 25 mg po qhs    pt does not binge , want to stay on  Wellbutrin 150 mg.  The patient will return to me in 1 week  The patient at this time does not have any suicidal or homicidal ideation, plan or intent.     Continue present meds  F/u as per counselours    Able to give informed consent:  YES       Discussed  Risks/Benefits/Side Effects/Alternatives: YES      Discussed with patient many issues of addiction,triggers/ relapse, and establishing a solid recovery program.

## 2017-10-05 NOTE — PROGRESS NOTES
10/5/17  Pt has been accepted into the [facility] outpatient program.  To set up intake appointment

## 2017-10-06 ENCOUNTER — HOSPITAL ENCOUNTER (OUTPATIENT)
Dept: BEHAVIORAL HEALTH | Facility: CLINIC | Age: 37
End: 2017-10-06
Attending: FAMILY MEDICINE
Payer: COMMERCIAL

## 2017-10-06 PROCEDURE — 10020000 ZZH LODGING PLUS FACILITY CHARGE ADULT

## 2017-10-06 PROCEDURE — H2035 A/D TX PROGRAM, PER HOUR: HCPCS | Mod: HQ

## 2017-10-06 NOTE — PROGRESS NOTES
"10/06/2017    Patient shared an assignment on \"Boundaires for Co dependents\" in group this morning. Counselor facilitated group along with peer feedback. Patient gained awareness on her unhealthy relationships due to her current boundaries. Patient stated she is willing to set new healthy boundaries but is afraid of any loneliness that may bring her.     "

## 2017-10-06 NOTE — PROGRESS NOTES
Name: Mayte Adame  Date: 10/6/2017  Medical Record: [id number]    Envelope Number: [id number]    List of Contents (List each item separately in new row):   Topiramate 25mg Tabs.    Admission:  I am responsible for any personal items that are not sent to the safe or pharmacy.  [facility] is not responsible for loss, theft or damage of any property in my possession.      Patient Signature:  ___________________________________________       Date/Time:__________________________    Staff Signature: __________________________________       Date/Time:__________________________    2nd Staff person, if patient is unable/unwilling to sign:      __________________________________________________________       Date/Time: __________________________      Discharge:  [facility] has returned all of my personal belongings:    Patient Signature: ________________________________________     Date/Time: ____________________________________    Staff Signature: ______________________________________     Date/Time:_____________________________________

## 2017-10-06 NOTE — PROGRESS NOTES
"Patient refusing to take Topamax due to medication side effect - reported blurred vision that has slowly been improving since stopping the medication. Also declining her Wellbutrin as she states \"I don't think that I need it.\" Dr. Michael updated at this time with patient preferences and side effects.   "

## 2017-10-06 NOTE — PROGRESS NOTES
"Patient refusing to take Topamax, requesting staff send to security so she does not \"accidentally take it\".. Message sent to prescriber, Dr. Michael and note for patient to meet with LP RN to discuss. Counselors also notified at this time.   "

## 2017-10-07 ENCOUNTER — HOSPITAL ENCOUNTER (OUTPATIENT)
Dept: BEHAVIORAL HEALTH | Facility: CLINIC | Age: 37
End: 2017-10-07
Attending: FAMILY MEDICINE
Payer: COMMERCIAL

## 2017-10-07 PROCEDURE — H2035 A/D TX PROGRAM, PER HOUR: HCPCS | Mod: HQ

## 2017-10-07 PROCEDURE — 10020000 ZZH LODGING PLUS FACILITY CHARGE ADULT

## 2017-10-08 ENCOUNTER — HOSPITAL ENCOUNTER (OUTPATIENT)
Dept: BEHAVIORAL HEALTH | Facility: CLINIC | Age: 37
End: 2017-10-08
Attending: FAMILY MEDICINE
Payer: COMMERCIAL

## 2017-10-08 PROCEDURE — H2035 A/D TX PROGRAM, PER HOUR: HCPCS | Mod: HQ

## 2017-10-08 PROCEDURE — 10020000 ZZH LODGING PLUS FACILITY CHARGE ADULT

## 2017-10-09 ENCOUNTER — HOSPITAL ENCOUNTER (OUTPATIENT)
Dept: BEHAVIORAL HEALTH | Facility: CLINIC | Age: 37
End: 2017-10-09
Attending: FAMILY MEDICINE
Payer: COMMERCIAL

## 2017-10-09 PROCEDURE — H2035 A/D TX PROGRAM, PER HOUR: HCPCS | Mod: HQ

## 2017-10-09 PROCEDURE — 10020000 ZZH LODGING PLUS FACILITY CHARGE ADULT

## 2017-10-10 ENCOUNTER — HOSPITAL ENCOUNTER (OUTPATIENT)
Dept: BEHAVIORAL HEALTH | Facility: CLINIC | Age: 37
End: 2017-10-10
Attending: FAMILY MEDICINE
Payer: COMMERCIAL

## 2017-10-10 PROCEDURE — 10020000 ZZH LODGING PLUS FACILITY CHARGE ADULT

## 2017-10-10 PROCEDURE — H2035 A/D TX PROGRAM, PER HOUR: HCPCS | Mod: HQ

## 2017-10-10 NOTE — PROGRESS NOTES
"Patient:  Mayte Adame            Adult CD Progress Note and Treatment Plan Review     Attendance  Please refer to OP BEH CD Adult Attendance Record Documentation Flowsheet    Support group attended this week: yes    Reporting sobriety:  yes    Treatment Plan     Treatment Plan Review competed on: 10/10/2017    Client preferred learning style: Visual  Hands on  Demonstration    Staff Members contributing  HARDIK Edwards and  HARDIK Jacobo       Received Supervision: Yes    Client: contributed to goals and plan.    Client received copy of plan/revised plan: Yes    Client agrees with plan/revised plan: Yes    Changes to Treatment Plan: No    New Goals added since last review No    Goals worked on since last review boundaries, co-dependency, relationships, communication skills, mental health, aftercare, grief    Strategies effective: yes    Strategies need these changes: Continue to address goals.    ASAM Risk Rating:    Dimension 1 Risk 0  Pt reports no withdrawal symptoms this past week.     Dimension 2 Risk 0 Pt seems fully functioning and seeks medical attention as needed.    Dimension 3 Risk 2 Pt's suicide risk assessment on admission was \"No Identified Risk\" Pt denies thoughts of self-harm or suicide ideation.  Pt has refused to take Wellbutrin and Topamax medications because of the side effects, nurse notified Dr Michael. Pt has a dx of binge eating disorder and continues to find balance with eating and body image. tiPt participated in the grief group facilitated by SHAHBAZ Sams LAD to gain support about the loss of her parents due to chemical dependency.     Dimension 4 Risk 2 Pt reports her motivation this week is the future, loving herself and relationship with her daughter.      Dimension 5 Risk 4 Pt report cravings of 2/10, ten being high. She reports they are subsiding. Pt will step down to [facility], for aftercare. She completed boundaries and codependency assignments Pt " completed communication skills assignment and presented in group. She shared who she could communicate well with those she did not with examples. She seemed to gain insight about improving skills with those who she has difficultly speaking with.      Dimension 6  Risk 4 Pt participated in the relationships workshop and completed all activities. She is spending free time with female peers and attending at least 3 12-step meetings weekly. She invited concerned persons to family program and they did not attend. Pt plans to return home after completing [facility].    Any changes in Vulnerable Adult Status?  No  If yes, add to treatment plan and individual abuse prevention plan.    Family Involvement:   family refuse family week    Data:   offered feedback client did actively participate  Pt reported the lecture on physical health gave her ideas of exercises she can do, when it's not possible for her to go to the gym.    Intervention:   Aftercare planning  Counselor feedback  Education  Group feedback  Twelve Step facilitation      Assessment:   Stages of Change Model  Preparation/Determination    Appears/Sounds:  Cooperative  Motivated  Engaged  Pt seems very engaged and a leader in the group. It appears at times, how she feels can impact group negatively or positively.     Plan:  Focus on recovery environment  Monitor emotional/physical health  Pt to enter [facility] for aftercare, she reports moving back home and returning to work.    HARDIK Jacobo

## 2017-10-11 ENCOUNTER — HOSPITAL ENCOUNTER (OUTPATIENT)
Dept: BEHAVIORAL HEALTH | Facility: CLINIC | Age: 37
End: 2017-10-11
Attending: FAMILY MEDICINE
Payer: COMMERCIAL

## 2017-10-11 PROCEDURE — 10020000 ZZH LODGING PLUS FACILITY CHARGE ADULT

## 2017-10-11 PROCEDURE — H2035 A/D TX PROGRAM, PER HOUR: HCPCS | Mod: HQ

## 2017-10-11 NOTE — PROGRESS NOTES
"10/11/17    Patient presented assignment on \"Learning to Live with Emotions\" and \"Releasing Anger\" in group this morning. Counselor facilitated group along with peer feedback. Patient shared examples from her past in which she used drugs to manage her emotions. Patient acknowledged that she lacks coping skills when it comes to her negative emotions. Patient received positive feedback from peers.   "

## 2017-10-12 ENCOUNTER — HOSPITAL ENCOUNTER (OUTPATIENT)
Dept: BEHAVIORAL HEALTH | Facility: CLINIC | Age: 37
End: 2017-10-12
Attending: FAMILY MEDICINE
Payer: COMMERCIAL

## 2017-10-12 PROCEDURE — H2035 A/D TX PROGRAM, PER HOUR: HCPCS | Mod: HQ

## 2017-10-12 NOTE — PROGRESS NOTES
[facility] Discharge Summary/Instructions     Patient: Mayte Adame  MRN: [id number]   : 1980 Age: 37 year old Sex: female   -  Focus of Treatment / Discharge Recommendations    Personal Safety/ Management of Symptoms    * Follow your safety plan.  Report increased symptoms to your care team and /or go to the nearest Emergency Department.  * Call crisis lines as needed  [facility] [phone]                [facility] [phone]  [facility] [phone]              [facility] [phone]  [facility] [phone]              [facility] [phone]  [facility] [phone]          National Suicide Prevention [phone]  [facility] [phone]            Suicide Prevention [phone]  [facility] [phone]    Abstinence/Relapse Prevention  * Take all medicines as directed.  Carry a current list of medicines with you.  * Use coping skills: nutrition, rest, exercise, spirituality, journal, meditation, seek sober support , engage in activities you enjoy  * Do not use illicit (street) drugs, controlled substances (narcotics) or alcohol.    Develop/Improve Independent Living/Socialization Skills: ensure your living environment is conducive to recovery    Community Resources/Supports and Discharge Planning:   Maintain abstinence from all mood altering substances, attend 2+ 12 step meetings per week, maintain contact with a program sponsor, enter the outpatient program at [facility] and follow recommendations, maintain contact with CPS  and follow recommendations of the courts, continue 1.1 therapy, maintain good physical and mental health care.   Follow up with psychiatrist / main caregiver:  TBD   Next visit: TBD  Follow up with your therapist: Helena Mas or alternative therapist    Next visit: TBD    Go to group therapy and / or support groups at: outpatient program and in home community    See your  medical doctor about:  Ongoing physical health care    Client Signature:_______________________   Date / Time:___________  Staff Signature:________________________   Date / Time:___________

## 2017-10-12 NOTE — PROGRESS NOTES
[address] [place] [address]          Mayte Adame, 1980, was admitted for evaluation/treatment of chemical dependency at [facility].  This person took part in these program(s):    ______ The Inpatient Program   ______ The Outpatient Program   __x___ [facility]   ______ [facility]       Date admitted: 09/18/17  Date discharged: 10/12/17    Type of discharge:   __x___ Satisfactory - completed evaluation / treatment   ______ Discharged without completing   ______ Behavioral discharge   ______ Transferred to another chemical dependency program   ______ Transferred to another type of service   ______ Left against medical advice (AMA) / Eloped       Comments: Referred to the outpatient program at [facility]      Counselor: HARDIK Taveras                       Date: 10/12/2017             Time: 7:40 AM

## 2017-10-12 NOTE — PROGRESS NOTES
10/12/17  Pt shared her relapse prevention plan and received a medallion from peers for program completion.  Pt met with counselor to review her plan for ongoing care.  Pt appears prepared for discharge at this time.  Pt discharged to her home.

## 2017-10-12 NOTE — PROGRESS NOTES
10/12/17   Pt met with counselors and requested discharge this date rather than 10/13/17. Pt is concerned about her daughter residing in an unsafe environment at this time.  Pt plans to enter the outpatient program at [facility] beginning the week of 10/16/17.  Counselors agreed to this request.

## 2017-10-14 NOTE — PROGRESS NOTES
CHEMICAL DEPENDENCY DISCHARGE SUMMARY      EVALUATION COUNSELOR:  LANA Cordero, [facility]   TREATMENT COUNSELORS:  Yeison Lino, [facility], Juanita Simons MA, [facility], and TROY Jacobo, [facility].   REFERRAL SOURCE:  Family Court Services.   PROGRAM:  [facility] [place] [facility].   ADMISSION DATE:  09/18/2017.   DISCHARGE DATE:  10/12/2017.   LAST SESSION DATE: 10/12/2017.   ADMISSION DIAGNOSES:   1.  303.90/F10.20, alcohol use disorder, moderate.   2.  304.30/F12.20, cannabis use disorder, severe.   3.  304.40/F15.20, amphetamine use disorder, severe.   DISCHARGE DIAGNOSES:   1.  303.90/F10.20, alcohol use disorder, moderate.   2.  304.30/F12.20, cannabis use disorder, severe.   3.  304.40/F15.20, amphetamine use disorder, severe.   DISCHARGE STATUS:  The patient completed most treatment plan goals and was discharged with counselor approval.   LAST USE DATE:  As provided by patient, 09/15/2017.   DAYS OF TREATMENT COMPLETED:  24.      PRESENTING INFORMATION:  Mayte Adame reports that she is in need of a chemical dependency evaluation per the request of family court services.  She has a history of past chemical dependency treatment.  She reports that she has trouble with work attendance and is not cooperating with court services family court.      SERVICES PROVIDED:  Assessment, patient orientation, treatment planning, individual counseling, group therapy sessions, spiritual care counseling, lectures, workshops focusing on relapse prevention, relationships, other addictions, recovery topics and aftercare planning.      ISSUES ADDRESSED IN TREATMENT:   DIMENSION 1/ACUTE INTOXICATION AND WITHDRAWAL POTENTIAL:  Initial risk factor 1.  Current risk factor 0.  The patient reports that her last use was 09/15/2017.  The patient did struggle with tiredness the first few days, which resolved.  The patient did not appear intoxicated or have any additional  "withdrawal symptoms while in [facility].      DIMENSION 2/BIOMEDICAL CONDITIONS AND COMPLAINTS:  Initial risk factor 1.  Current risk factor 0.  On admission, the patient had 6 stitches in her right hand, which were removed within the first week of admission.  She has a history of esophageal reflux, migraines and urinary tract infection which she completed a course of antibiotics.  In early 2017, patient had abdominal surgery.      DIMENSION 3/EMOTIONAL/BEHAVIORAL/COGNITIVE CONDITIONS AND COMPLICATIONS:  Initial risk factor 2.  Current risk factor 2.  The patient reports a history of depression and is not currently on any mental health medications.  She did see the staff psychiatrist, was put on medications, and then decided that she did not want to take them because of side effects.  The patient participated in a suicide assessment.  Her risk is no identified risk.  She was monitored while in [facility] for increased symptoms.  The patient reports a history of low self-esteem and her body image impacts her self-esteem.  The patient completed the \"Book of Me,\"  daily affirmations and a gratitude list.  She also completed the \"Self-esteem/Loneliness Connection\" as she also does not like to be alone.  The patient reports that she has unresolved grief regarding the loss of both of her parents due to chemical dependency.  She participated in the weekly grief group to gain support to heal. Pt's risk factor did not change as she did not follow recommendations of psychiatrist or ask for alternative options.     DIMENSION 4/READINESS TO CHANGE:  Initial risk factor 3.  Current risk factor 1.  The patient has consequences to herself and others due to use, especially involvement with family court services.  She completed the consequences assignment where she identified values violated, emotional consequences and insane behaviors.  She also shared 10 consequences her children have suffered due to her use.  The patient has " "continued to use despite consequences with family court services.  She completed an individual collage about what her life would look like in 5 years using versus sober.  The patient has cross addiction with food and drugs.  She gained insight on how cross addiction impacts her life negatively by reading \"A Look at Cross Addiction\" and identifying how it plays out in her life and ways to prevent further cross addiction. She made progress in this dimension.      DIMENSION 5/RELAPSE, CONTINUED USE, CONTINUED PROBLEM POTENTIAL:  Initial risk factor 4.  Current risk factor 3.  The patient has limited insight into her personal relapse process, triggers, warning signs and lacks coping skills.  She gained insight by attending the relapse prevention weekend workshops.  She completed a detailed relapse prevention packet and she is stepping down to aftercare at [facility].  The patient reports codependency with men.  She read \"Boundaries for Codependency and Male Dependency.\"  She was able to look at how her life had been impacted, ways to have healthy boundaries and relationships.  The patient reports guilt and shame for past use and behaviors, for example, not being a good example for her daughter.  The patient completed the \"Guilt, Shame and Self-forgiveness\" packet.  The patient reports difficulty expressing feelings.  She read \"Emotional Maturity, Learning to Live with Emotions, Releasing Anger and Resentments.\"  She shared how she could relate and how to express feelings in a healthy way including anger.  She also completed \"Communications Skills and Feelings and Defenses.\"  The patient reports a history of liking to be in control and willing to look at her perfection.  She was given materials on \"Perfection and Letting Go of the Need to Control.\"  She did not complete this assignment. She made some progress in this dimension.     DIMENSION 6/RECOVERY ENVIRONMENT:  Initial risk factor 4.  Current risk factor 3.  The " patient's relationship with son and boyfriend are strained due to use.  She did not sign a release of information to invite them to the family program.  The patient has a limited sober support network in recovery and does not have a sponsor.  The patient began to develop relationships with female peers by spending time with them, attending at least three 12-step meetings weekly while in treatment, and she did secure a temporary sponsor.  The patient reports she has a family court appearance in October and has an open CPS case.  She signed releases of information for the legal system. She made some progress in this dimension.     STRENGTHS:  As identified by the patient, she is compassionate, intuitive, intelligent, spiritual leader and outgoing.  The patient did take on a leadership role with peers.      [facility] AT DISCHARGE:  [address] [place] [address], [address] [place] [address].  Telephone number:  [phone].      CONTINUING CARE RECOMMENDATIONS AND REFERRALS:   1.  Abstain from all mood-altering substances except those prescribed if nonaddictive.   2.  Attend at least two 12-step meetings weekly until entering [facility] and then follow their recommendations.   3.  Maintain contact with female sponsor.   4.  Enter aftercare at [facility] until discharge with counselor approval.   5.  Monitor and comply with the advice of your doctor regarding mental and physical health, remain medication compliant.   6.  Continue investment in building a sober support network in recovery.   7.  Continue monitoring and understanding relapse prevention triggers and stressors through the use and development of healthy coping skills.   8.  Continue to pursue volunteer activities and sober meaningful activities.   9.  Complete all legal requirements to resolve issues.      CC[facility]  [facility]  [phone].         This information has been disclosed to you from records protected by  Federal confidentiality rules (42 CFR part 2). The Federal rules prohibit you from making any further disclosure of this information unless further disclosure is expressly permitted by the written consent of the person to whom it pertains or as otherwise permitted by 42 CFR part 2. A general authorization for the release of medical or other information is NOT sufficient for this purpose. The Federal rules restrict any use of the information to criminally investigate or prosecute any alcohol or drug abuse patient.      TROY ESPARZA, [facility]             D: 10/13/2017 14:25   T: 10/14/2017 06:48   MT: lg      Name:     MINOR RODRIGUEZ   MRN:      [id number]        Account:      [id number]   :      1980           Visit Date:   10/12/2017      Document: [id number]

## 2018-06-09 ENCOUNTER — HEALTH MAINTENANCE LETTER (OUTPATIENT)
Age: 38
End: 2018-06-09

## 2018-10-22 ENCOUNTER — OFFICE VISIT (OUTPATIENT)
Dept: FAMILY MEDICINE | Facility: CLINIC | Age: 38
End: 2018-10-22
Payer: COMMERCIAL

## 2018-10-22 VITALS
SYSTOLIC BLOOD PRESSURE: 115 MMHG | HEART RATE: 91 BPM | RESPIRATION RATE: 20 BRPM | WEIGHT: 150.8 LBS | BODY MASS INDEX: 30.4 KG/M2 | DIASTOLIC BLOOD PRESSURE: 77 MMHG | TEMPERATURE: 98.2 F | OXYGEN SATURATION: 98 % | HEIGHT: 59 IN

## 2018-10-22 DIAGNOSIS — N76.0 BACTERIAL VAGINAL INFECTION: Primary | ICD-10-CM

## 2018-10-22 DIAGNOSIS — B96.89 BACTERIAL VAGINAL INFECTION: Primary | ICD-10-CM

## 2018-10-22 PROCEDURE — 99213 OFFICE O/P EST LOW 20 MIN: CPT | Performed by: PHYSICIAN ASSISTANT

## 2018-10-22 RX ORDER — METRONIDAZOLE 500 MG/1
500 TABLET ORAL 2 TIMES DAILY
Qty: 14 TABLET | Refills: 0 | Status: SHIPPED | OUTPATIENT
Start: 2018-10-22 | End: 2018-10-22

## 2018-10-22 RX ORDER — METRONIDAZOLE 500 MG/1
500 TABLET ORAL 2 TIMES DAILY
Qty: 14 TABLET | Refills: 0 | Status: SHIPPED | OUTPATIENT
Start: 2018-10-22

## 2018-10-22 ASSESSMENT — PAIN SCALES - GENERAL: PAINLEVEL: NO PAIN (0)

## 2018-10-22 NOTE — PATIENT INSTRUCTIONS
".cj  Bacterial Vaginosis    You have a bacterial infection of the vagina called bacterial vaginosis (BV). It may also be called gardnerella or non-specific vaginitis. BV occurs when the \"bad\" bacteria outnumber the \"good\" bacteria that are normally present in the vagina. Symptoms include foul-smelling vaginal discharge (most noticeable after vaginal intercourse). There may also be burning with urination. The burning is caused as the urine passes over the inflamed outer vaginal area.  The cause of bacterial vaginosis is not certain. However, your risk is higher if you recently began a new sexual relationship, or have had many sex partners in the past. Your risk is also higher if you douche often.  While bacterial vaginosis most often occurs only in sexually active women, this is not a true sexually transmitted disease. You did not get this from your partner. You cannot give it to your partner. The infection may be related to temporary changes in the pH of vaginal fluids after being exposed to semen.  Home Care:    Keep the genital area clean and free of discharge. Do this by wearing an absorbent sanitary pad and changing it often. Shower daily. When you shower, clean the outer vaginal area with plain soap and water.    Do not douche during treatment unless advised to do so by your doctor. Routine douching after treatment is no longer recommended to clean the vagina. It raises your risk of vaginal infection and pelvic inflammatory disease.    Avoid having sex until you have finished all antibiotic medicine and all symptoms have gone away.    Wear cotton underwear or cotton-lined panty hose. Don t wear pants that are too tight.    Limiting the number of sex partners you have lowers your risk of this and other vaginal infections, STDs, and HIV.    Take all medicine as directed until it is gone, even if you are feeling better. If you don t do this, symptoms might return.  Follow Up  with your doctor if symptoms don t go " away after the medicine is finished.  Get Prompt Medical Attention  if any of the following occur:    Fever of 100.4 F (38 C) or higher, or as directed by your healthcare provider    Lower abdominal pain    Rash or joint pain    Painful sores around the outer vaginal area or on your partner s penis    [id number] [employer]ower, [address] [place] [address], [place] [address]. All rights reserved. This information is not intended as a substitute for professional medical care. Always follow your healthcare professional's instructions.

## 2018-10-22 NOTE — MR AVS SNAPSHOT
"              After Visit Summary   10/22/2018    Mayte Adame    MRN: [id number]           Patient Information     Date Of Birth          1980        Visit Information        Provider Department      10/22/2018 3:20 PM Cooper Nelson PA [facility] [address]        Today's Diagnoses     Bacterial vaginal infection    -  1      Care Instructions    .cj  Bacterial Vaginosis    You have a bacterial infection of the vagina called bacterial vaginosis (BV). It may also be called gardnerella or non-specific vaginitis. BV occurs when the \"bad\" bacteria outnumber the \"good\" bacteria that are normally present in the vagina. Symptoms include foul-smelling vaginal discharge (most noticeable after vaginal intercourse). There may also be burning with urination. The burning is caused as the urine passes over the inflamed outer vaginal area.  The cause of bacterial vaginosis is not certain. However, your risk is higher if you recently began a new sexual relationship, or have had many sex partners in the past. Your risk is also higher if you douche often.  While bacterial vaginosis most often occurs only in sexually active women, this is not a true sexually transmitted disease. You did not get this from your partner. You cannot give it to your partner. The infection may be related to temporary changes in the pH of vaginal fluids after being exposed to semen.  Home Care:    Keep the genital area clean and free of discharge. Do this by wearing an absorbent sanitary pad and changing it often. Shower daily. When you shower, clean the outer vaginal area with plain soap and water.    Do not douche during treatment unless advised to do so by your doctor. Routine douching after treatment is no longer recommended to clean the vagina. It raises your risk of vaginal infection and pelvic inflammatory disease.    Avoid having sex until you have finished all antibiotic medicine and all symptoms have gone " away.    Wear cotton underwear or cotton-lined panty hose. Don t wear pants that are too tight.    Limiting the number of sex partners you have lowers your risk of this and other vaginal infections, STDs, and HIV.    Take all medicine as directed until it is gone, even if you are feeling better. If you don t do this, symptoms might return.  Follow Up  with your doctor if symptoms don t go away after the medicine is finished.  Get Prompt Medical Attention  if any of the following occur:    Fever of 100.4 F (38 C) or higher, or as directed by your healthcare provider    Lower abdominal pain    Rash or joint pain    Painful sores around the outer vaginal area or on your partner s penis    [id number] [facility], [address] [place] [address], [place] [address]. All rights reserved. This information is not intended as a substitute for professional medical care. Always follow your healthcare professional's instructions.                  Follow-ups after your visit        Follow-up notes from your care team     Return in about 1 year (around 10/22/2019), or if symptoms worsen or fail to improve, for Annual Physical.      Who to contact     If you have questions or need follow up information about today's clinic visit or your schedule please contact [facility] directly at [phone].  Normal or non-critical lab and imaging results will be communicated to you by 20lineshart, letter or phone within 4 business days after the clinic has received the results. If you do not hear from us within 7 days, please contact the clinic through 20lineshart or phone. If you have a critical or abnormal lab result, we will notify you by phone as soon as possible.  Submit refill requests through Bityota or call your pharmacy and they will forward the refill request to us. Please allow 3 business days for your refill to be completed.          Additional Information About Your Visit        Bityota Information     Bityota lets you send  "messages to your doctor, view your test results, renew your prescriptions, schedule appointments and more. To sign up, go to [email or web address] . Click on \"Log in\" on the left side of the screen, which will take you to the Welcome page. Then click on \"Sign up Now\" on the right side of the page.     You will be asked to enter the access code listed below, as well as some personal information. Please follow the directions to create your username and password.     Your access code is: [email or web address]  Expires: 2019  3:47 PM     Your access code will  in 90 days. If you need help or a new code, please call your [place] clinic or [phone].        Care EveryWhere ID     This is your Care EveryWhere ID. This could be used by other organizations to access your [place] medical records  [id number]        Your Vitals Were     Pulse Temperature Respirations Height Last Period Pulse Oximetry    91 98.2  F (36.8  C) (Oral) 20 4' 11\" (1.499 m) (LMP Unknown) 98%    BMI (Body Mass Index)                   30.46 kg/m2            Blood Pressure from Last 3 Encounters:   10/22/18 115/77   17 100/70   17 100/64    Weight from Last 3 Encounters:   10/22/18 150 lb 12.8 oz (68.4 kg)   17 148 lb (67.1 kg)   17 144 lb (65.3 kg)              Today, you had the following     No orders found for display         Today's Medication Changes          These changes are accurate as of 10/22/18  3:48 PM.  If you have any questions, ask your nurse or doctor.               Start taking these medicines.        Dose/Directions    metroNIDAZOLE 500 MG tablet   Commonly known as:  FLAGYL   Used for:  Bacterial vaginal infection   Started by:  Cooper Nelson PA        Dose:  500 mg   Take 1 tablet (500 mg) by mouth 2 times daily   Quantity:  14 tablet   Refills:  0            Where to get your medicines      These medications were sent to [facility] - [place], [place] - [address]  [address], [place] [address]     Phone:  [phone]     metroNIDAZOLE 500 MG tablet                Primary Care Provider Office Phone # Fax #    Cooper AIDEN Melton [phone] [address] [facility] [address]        Equal Access to Services     OLIVIA KAN : Hadii aad ku hadasho Soomaali, waaxda luqadaha, qaybta kaalmada adeegyada, waxay idiin hayaan adeeg kharash la'aan . So [facility] [phone].    ATENCIÓN: Si habla español, tiene a hernandez disposición servicios gratuitos de asistencia lingüística. [facility] [phone].    We comply with applicable federal civil rights laws and [place] laws. We do not discriminate on the basis of race, color, national origin, age, disability, sex, sexual orientation, or gender identity.            Thank you!     Thank you for choosing [facility]  for your care. Our goal is always to provide you with excellent care. Hearing back from our patients is one way we can continue to improve our services. Please take a few minutes to complete the written survey that you may receive in the mail after your visit with us. Thank you!             Your Updated Medication List - Protect others around you: Learn how to safely use, store and throw away your medicines at www.disposemymeds.org.          This list is accurate as of 10/22/18  3:48 PM.  Always use your most recent med list.                   Brand Name Dispense Instructions for use Diagnosis    alum & mag hydroxide-simethicone 200-200-20 MG/5ML Susp suspension    MYLANTA/MAALOX     Take 30 mLs by mouth every 6 hours as needed for indigestion        buPROPion 150 MG 24 hr tablet    WELLBUTRIN XL    30 tablet    Take 1 tablet (150 mg) by mouth every morning    Binge eating disorder       ciprofloxacin 250 MG tablet    CIPRO     Take 250 mg by mouth 2 times daily        fluticasone 50 MCG/ACT spray    FLONASE    1 Bottle    Spray 1-2 sprays into both nostrils  daily    Chronic seasonal allergic rhinitis due to other allergen       guaiFENesin 20 mg/mL Soln solution    ROBITUSSIN     Take 10 mLs by mouth every 4 hours as needed for cough        IBUPROFEN PO      Take 200-400 mg by mouth every 6 hours as needed for moderate pain        * loratadine 10 MG tablet    CLARITIN     Take 10 mg by mouth daily as needed for allergies        * loratadine 10 MG tablet    CLARITIN    30 tablet    Take 1 tablet (10 mg) by mouth daily    Chronic seasonal allergic rhinitis due to other allergen       MELATONIN PO      Take 3 mg by mouth nightly as needed        metroNIDAZOLE 500 MG tablet    FLAGYL    14 tablet    Take 1 tablet (500 mg) by mouth 2 times daily    Bacterial vaginal infection       neomycin-bacitracin-polymyxin 5-400-5000 ointment     3.5 g    Apply topically 2 times daily    Laceration of right hand, foreign body presence unspecified, subsequent encounter       NUTRICAP PO      Take 1 packet by mouth once        phenol-menthol 14.5 MG lozenge      Place 1 lozenge inside cheek every 2 hours as needed for moderate pain        senna-docusate 8.6-50 MG per tablet    SENOKOT-S;PERICOLACE     Take 2 tablets by mouth daily as needed for constipation        topiramate 25 MG tablet    TOPAMAX    60 tablet    Take 1 tablet (25 mg) by mouth At Bedtime    Binge eating disorder       TYLENOL PO      Take 325-650 mg by mouth every 4 hours as needed for mild pain or fever        * Notice:  This list has 2 medication(s) that are the same as other medications prescribed for you. Read the directions carefully, and ask your doctor or other care provider to review them with you.

## 2018-10-22 NOTE — Clinical Note
Please abstract the following data from this visit with this patient into the appropriate field in Epic:  Pap smear with HPV done on this date: 2017 (approximately), by this group: [facility], results were normal.  In care jose

## 2018-10-22 NOTE — PROGRESS NOTES
SUBJECTIVE:   Mayte Adame is a 38 year old female who presents to clinic today for the following health issues    Foreign Body in Vaginal: Tampon  Possibly Stuck, possibly for 2-3 weeks-  Can't remember if she took it out.  Doesn't feel anything in there.  Is having a little discharge and some odor over past two days.  No fevers.  No abd pain.                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     Depressive disorder 2003     Esophageal reflux 12/2014    Associated with weight gain     Migraines 6/2015     Urinary tract infection 2006         Current Outpatient Prescriptions on File Prior to Visit:  Acetaminophen (TYLENOL PO) Take 325-650 mg by mouth every 4 hours as needed for mild pain or fever   alum & mag hydroxide-simethicone (MYLANTA/MAALOX) 200-200-20 MG/5ML SUSP suspension Take 30 mLs by mouth every 6 hours as needed for indigestion   buPROPion (WELLBUTRIN XL) 150 MG 24 hr tablet Take 1 tablet (150 mg) by mouth every morning (Patient not taking: Reported on 10/22/2018)   ciprofloxacin (CIPRO) 250 MG tablet Take 250 mg by mouth 2 times daily   fluticasone (FLONASE) 50 MCG/ACT spray Spray 1-2 sprays into both nostrils daily (Patient not taking: Reported on 10/22/2018)   guaiFENesin (ROBITUSSIN) 20 mg/mL SOLN solution Take 10 mLs by mouth every 4 hours as needed for cough   IBUPROFEN PO Take 200-400 mg by mouth every 6 hours as needed for moderate pain   loratadine (CLARITIN) 10 MG tablet Take 1 tablet (10 mg) by mouth daily (Patient not taking: Reported on 10/22/2018)   loratadine (CLARITIN) 10 MG tablet Take 10 mg by mouth daily as needed  "for allergies   MELATONIN PO Take 3 mg by mouth nightly as needed   Multiple Vitamins-Minerals (NUTRICAP PO) Take 1 packet by mouth once   neomycin-bacitracin-polymyxin (NEOSPORIN) 5-400-5000 ointment Apply topically 2 times daily (Patient not taking: Reported on 10/22/2018)   phenol-menthol (CEPASTAT) 14.5 MG lozenge Place 1 lozenge inside cheek every 2 hours as needed for moderate pain   senna-docusate (SENOKOT-S;PERICOLACE) 8.6-50 MG per tablet Take 2 tablets by mouth daily as needed for constipation   topiramate (TOPAMAX) 25 MG tablet Take 1 tablet (25 mg) by mouth At Bedtime (Patient not taking: Reported on 10/22/2018)     Current Facility-Administered Medications on File Prior to Visit:  Self Administer Medications: Behavioral Services       Social History   Substance Use Topics     Smoking status: Never Smoker     Smokeless tobacco: Never Used     Alcohol use No       ROS:  General: negative for fever  GYN- discharge as above  ABD: as above  : no dysuria     OBJECTIVE:  /77 (BP Location: Left arm, Patient Position: Sitting, Cuff Size: Adult Regular)  Pulse 91  Temp 98.2  F (36.8  C) (Oral)  Resp 20  Ht 4' 11\" (1.499 m)  Wt 150 lb 12.8 oz (68.4 kg)  LMP  (LMP Unknown)  SpO2 98%  BMI 30.46 kg/m2   General:   awake, alert, and cooperative.  NAD.   Head: Normocephalic, atraumatic.  Eyes: Conjunctiva clear, non icteric.    :  Exam completed with female chaperone Pa  in room. normal cervix, adnexae,  without masses, no FBs, moderate milky discharge, no external lesoins  Neuro: Alert and oriented - normal speech.     ASSESSMENT:    ICD-10-CM    1. Bacterial vaginal infection N76.0 metroNIDAZOLE (FLAGYL) 500 MG tablet    B96.89 DISCONTINUED: metroNIDAZOLE (FLAGYL) 500 MG tablet           PLAN: As per ordered above. Follow up prn .  Advised about symptoms which might herald more serious problems.            "

## 2018-10-23 ASSESSMENT — PATIENT HEALTH QUESTIONNAIRE - PHQ9: SUM OF ALL RESPONSES TO PHQ QUESTIONS 1-9: 15

## 2019-09-29 ENCOUNTER — HEALTH MAINTENANCE LETTER (OUTPATIENT)
Age: 39
End: 2019-09-29

## 2021-01-14 ENCOUNTER — HEALTH MAINTENANCE LETTER (OUTPATIENT)
Age: 41
End: 2021-01-14

## 2021-10-23 ENCOUNTER — HEALTH MAINTENANCE LETTER (OUTPATIENT)
Age: 41
End: 2021-10-23

## 2022-02-12 ENCOUNTER — HEALTH MAINTENANCE LETTER (OUTPATIENT)
Age: 42
End: 2022-02-12

## 2022-10-10 ENCOUNTER — HEALTH MAINTENANCE LETTER (OUTPATIENT)
Age: 42
End: 2022-10-10

## 2023-03-25 ENCOUNTER — HEALTH MAINTENANCE LETTER (OUTPATIENT)
Age: 43
End: 2023-03-25

## 2024-03-17 ENCOUNTER — HEALTH MAINTENANCE LETTER (OUTPATIENT)
Age: 44
End: 2024-03-17